# Patient Record
Sex: FEMALE | Race: WHITE | NOT HISPANIC OR LATINO | Employment: FULL TIME | URBAN - METROPOLITAN AREA
[De-identification: names, ages, dates, MRNs, and addresses within clinical notes are randomized per-mention and may not be internally consistent; named-entity substitution may affect disease eponyms.]

---

## 2017-06-15 ENCOUNTER — HOSPITAL ENCOUNTER (EMERGENCY)
Facility: CLINIC | Age: 47
Discharge: HOME | End: 2017-06-16
Attending: EMERGENCY MEDICINE | Admitting: EMERGENCY MEDICINE
Payer: COMMERCIAL

## 2017-06-15 DIAGNOSIS — R07.9 CHEST PAIN, UNSPECIFIED TYPE: ICD-10-CM

## 2017-06-15 PROCEDURE — 25000128 H RX IP 250 OP 636: Performed by: EMERGENCY MEDICINE

## 2017-06-15 PROCEDURE — 85379 FIBRIN DEGRADATION QUANT: CPT | Performed by: EMERGENCY MEDICINE

## 2017-06-15 PROCEDURE — 99285 EMERGENCY DEPT VISIT HI MDM: CPT | Mod: 25

## 2017-06-15 PROCEDURE — 83690 ASSAY OF LIPASE: CPT | Performed by: EMERGENCY MEDICINE

## 2017-06-15 PROCEDURE — 84484 ASSAY OF TROPONIN QUANT: CPT | Performed by: EMERGENCY MEDICINE

## 2017-06-15 PROCEDURE — 85027 COMPLETE CBC AUTOMATED: CPT | Performed by: EMERGENCY MEDICINE

## 2017-06-15 PROCEDURE — 80076 HEPATIC FUNCTION PANEL: CPT | Performed by: EMERGENCY MEDICINE

## 2017-06-15 PROCEDURE — 80048 BASIC METABOLIC PNL TOTAL CA: CPT | Performed by: EMERGENCY MEDICINE

## 2017-06-15 PROCEDURE — 96375 TX/PRO/DX INJ NEW DRUG ADDON: CPT

## 2017-06-15 PROCEDURE — 84703 CHORIONIC GONADOTROPIN ASSAY: CPT | Performed by: EMERGENCY MEDICINE

## 2017-06-15 PROCEDURE — 93005 ELECTROCARDIOGRAM TRACING: CPT

## 2017-06-15 PROCEDURE — 96374 THER/PROPH/DIAG INJ IV PUSH: CPT

## 2017-06-15 RX ORDER — LORAZEPAM 2 MG/ML
1 INJECTION INTRAMUSCULAR ONCE
Status: COMPLETED | OUTPATIENT
Start: 2017-06-15 | End: 2017-06-15

## 2017-06-15 RX ADMIN — LORAZEPAM 1 MG: 2 INJECTION INTRAMUSCULAR; INTRAVENOUS at 23:54

## 2017-06-15 ASSESSMENT — ENCOUNTER SYMPTOMS
FEVER: 0
VOMITING: 0
NAUSEA: 1
SHORTNESS OF BREATH: 0

## 2017-06-15 NOTE — ED AVS SNAPSHOT
[facility] Emergency Department    [address]    [facility] [address]    Phone:  [phone]    Fax:  [phone]                                       Jenifer Delong   MRN: [id number]    Department:  [facility] Emergency Department   Date of Visit:  6/15/2017           Patient Information     Date Of Birth          1970        Your diagnoses for this visit were:     Chest pain, unspecified type        You were seen by Roney Riggs MD.      Follow-up Information     Follow up with Greg, Monie Thurman. Schedule an appointment as soon as possible for a visit in 3 days.    Contact information:    [address]  [place] [address] [phone]          Discharge Instructions       Discharge Instructions  Chest Pain    You have been seen today for chest pain or discomfort.  At this time, your doctor has found no signs that your chest pain is due to a serious or life-threatening condition, (or you have declined more testing and/or admission to the hospital). However, sometimes there is a serious problem that does not show up right away. Your evaluation today may not be complete and you may need further testing and evaluation.     You need to follow-up with your regular doctor within 3 days.    Return to the Emergency Department if:    Your chest pain changes, gets worse, starts to happen more often, or comes with less activity.    You are short of breath.    You get very weak or tired.    You pass out or faint.    You have any new symptoms, like fever, cough, numb legs, or you cough up blood.    You have anything else that worries you.    Until you follow-up with your regular doctor please do the following:    Take one aspirin daily unless you have an allergy or are told not to by your doctor.    If a stress test appointment has been made, go to the appointment.    If you have questions, contact your regular doctor.    If your doctor today has told  you to follow-up with your regular doctor, it is very important that you make an appointment with your clinic and go to the appointment.  If you do not follow-up with your primary doctor, it may result in missing an important development which could result in permanent injury or disability and/or lasting pain.  If there is any problem keeping your appointment, call your doctor or return to the Emergency Department.    If you were given a prescription for medicine here today, be sure to read all of the information (including the package insert) that comes with your prescription.  This will include important information about the medicine, its side effects, and any warnings that you need to know about.  The pharmacist who fills the prescription can provide more information and answer questions you may have about the medicine.  If you have questions or concerns that the pharmacist cannot address, please call or return to the Emergency Department.         24 Hour Appointment Hotline       To make an appointment at any [facility], call [phone] ([phone]). If you don't have a family doctor or clinic, we will help you find one. [facility] are conveniently located to serve the needs of you and your family.             Review of your medicines      Our records show that you are taking the medicines listed below. If these are incorrect, please call your family doctor or clinic.        Dose / Directions Last dose taken    doxycycline 100 MG capsule   Commonly known as:  VIBRAMYCIN   Dose:  100 mg   Quantity:  20 capsule        Take 1 capsule (100 mg) by mouth 2 times daily   Refills:  0        EPINEPHrine 0.3 MG/0.3ML injection   Commonly known as:  EPIPEN 2-ADIS   Dose:  0.3 mg   Quantity:  1 each        Inject 0.3 mLs (0.3 mg) into the muscle once as needed for anaphylaxis (For sudden onset of difficulty breathing and or mouth/throat swelling)   Refills:  0        guaiFENesin-codeine 100-10 MG/5ML Soln  solution   Commonly known as:  ROBITUSSIN AC   Dose:  1 tsp.   Quantity:  120 mL        Take 5 mLs by mouth every 4 hours as needed for cough   Refills:  0        HYDROcodone-acetaminophen 5-325 MG per tablet   Commonly known as:  NORCO   Dose:  1 tablet   Quantity:  17 tablet        Take 1 tablet by mouth every 6 hours as needed for moderate to severe pain   Refills:  0        ibuprofen 600 MG tablet   Commonly known as:  ADVIL/MOTRIN   Dose:  600 mg   Quantity:  20 tablet        Take 1 tablet by mouth every 6 hours as needed for other (For mild pain or temperature greater than 102F).   Refills:  0        MIRALAX PO        Refills:  0        MIRENA (52 MG) 20 MCG/24HR IUD   Dose:  1 each   Generic drug:  levonorgestrel        1 each by Intrauterine route once   Refills:  0        NORVASC 5 MG tablet   Dose:  5 mg   Generic drug:  amLODIPine        Take 5 mg by mouth daily   Refills:  0        Polyethylene Glycol 4500 Powd   Dose:  4 oz   Quantity:  1 Bottle        4 oz every 15 minutes as needed   Refills:  0        SENNA LAXATIVE PO        Refills:  0        TYLENOL PO   Dose:  1000 mg        Take 1,000 mg by mouth as needed for mild pain or fever   Refills:  0        venlafaxine 150 MG 24 hr capsule   Commonly known as:  EFFEXOR-XR        Refills:  0                Procedures and tests performed during your visit     Procedure/Test Number of Times Performed    Abdomen US, limited (RUQ only) 1    Basic metabolic panel (BMP) 1    CBC (platelets, no diff) 1    Chest XR,  PA & LAT 1    D dimer quantitative 1    HCG QUALitative pregnancy (blood) 1    Hepatic panel 1    Lipase 1    Peripheral IV catheter 1    Troponin I (now) 2      Orders Needing Specimen Collection     None      Pending Results     Date and Time Order Name Status Description    6/16/2017 0023 Abdomen US, limited (RUQ only) Preliminary     6/16/2017 0015 Chest XR,  PA & LAT Preliminary             Pending Culture Results     No orders found for last 3  day(s).            Pending Results Instructions     If you had any lab results that were not finalized at the time of your Discharge, you can call the ED Lab Result RN at [phone]. You will be contacted by this team for any positive Lab results or changes in treatment. The nurses are available 7 days a week from 10A to 6:30P.  You can leave a message 24 hours per day and they will return your call.        Test Results From Your Hospital Stay        6/16/2017 12:02 AM      Component Results     Component Value Ref Range & Units Status    WBC 8.0 4.0 - 11.0 10e9/L Final    RBC Count 4.52 3.8 - 5.2 10e12/L Final    Hemoglobin 12.2 11.7 - 15.7 g/dL Final    Hematocrit 37.9 35.0 - 47.0 % Final    MCV 84 78 - 100 fl Final    MCH 27.0 26.5 - 33.0 pg Final    MCHC 32.2 31.5 - 36.5 g/dL Final    RDW 14.4 10.0 - 15.0 % Final    Platelet Count 348 150 - 450 10e9/L Final         6/16/2017 12:20 AM      Component Results     Component Value Ref Range & Units Status    Sodium 137 133 - 144 mmol/L Final    Potassium 3.8 3.4 - 5.3 mmol/L Final    Chloride 105 94 - 109 mmol/L Final    Carbon Dioxide 27 20 - 32 mmol/L Final    Anion Gap 5 3 - 14 mmol/L Final    Glucose 121 (H) 70 - 99 mg/dL Final    Urea Nitrogen 19 7 - 30 mg/dL Final    Creatinine 1.12 (H) 0.52 - 1.04 mg/dL Final    GFR Estimate 52 (L) >60 mL/min/1.7m2 Final    Non  GFR Calc    GFR Estimate If Black 63 >60 mL/min/1.7m2 Final    African American GFR Calc    Calcium 9.0 8.5 - 10.1 mg/dL Final         6/16/2017 12:20 AM      Component Results     Component Value Ref Range & Units Status    Troponin I ES  0.000 - 0.045 ug/L Final    <0.015  The 99th percentile for upper reference range is 0.045 ug/L.  Troponin values in   the range of 0.045 - 0.120 ug/L may be associated with risks of adverse   clinical events.           6/16/2017 12:33 AM      Component Results     Component Value Ref Range & Units Status    HCG Qualitative Serum Negative NEG Final          6/16/2017 12:12 AM      Component Results     Component Value Ref Range & Units Status    D Dimer 0.4 0.0 - 0.50 ug/ml FEU Final    This D-dimer assay is intended for use in conjuntion with a clinical pretest   probability assessment model to exclude pulmonary embolism (PE) and as an aid   in the diagnosis of deep venous thrombosis (DVT) in outpatients suspected of   PE   or DVT. The cut-off value is 0.5 g/mL FEU.           6/16/2017 12:59 AM      Narrative     XR CHEST 2 VW  6/16/2017 12:45 AM      HISTORY: Chest pain.    COMPARISON: 9/12/2013.    FINDINGS: The heart size is normal. The lungs are clear. No  pneumothorax or pleural effusion.        Impression     IMPRESSION: No acute abnormality.         6/16/2017  1:24 AM      Narrative     US ABDOMEN LIMITED  6/16/2017 1:07 AM      HISTORY: Epigastric pain.     COMPARISON: None.    FINDINGS: The liver is normal in size and texture without focal mass.  There is no intra or extrahepatic biliary dilatation. The common  hepatic duct measures 0.4 cm. The gallbladder is partially contracted.  No gallstones. The pancreas head and body appear normal. The tail is  obscured by bowel gas. The right kidney measures 10.7 cm and is normal  in appearance. The proximal abdominal aorta and IVC appear normal.         Impression     IMPRESSION: Normal right upper quadrant. No gallstone or biliary  dilatation.         6/16/2017 12:47 AM      Component Results     Component Value Ref Range & Units Status    Bilirubin Direct <0.1 0.0 - 0.2 mg/dL Final    Bilirubin Total 0.3 0.2 - 1.3 mg/dL Final    Albumin 4.0 3.4 - 5.0 g/dL Final    Protein Total 7.8 6.8 - 8.8 g/dL Final    Alkaline Phosphatase 83 40 - 150 U/L Final    ALT 44 0 - 50 U/L Final    AST 31 0 - 45 U/L Final         6/16/2017 12:32 AM      Component Results     Component Value Ref Range & Units Status    Lipase 211 73 - 393 U/L Final         6/16/2017  2:18 AM      Component Results     Component Value Ref Range &  Units Status    Troponin I ES  0.000 - 0.045 ug/L Final    <0.015  The 99th percentile for upper reference range is 0.045 ug/L.  Troponin values in   the range of 0.045 - 0.120 ug/L may be associated with risks of adverse   clinical events.                  Clinical Quality Measure: Blood Pressure Screening     Your blood pressure was checked while you were in the emergency department today. The last reading we obtained was  BP: 119/82 . Please read the guidelines below about what these numbers mean and what you should do about them.  If your systolic blood pressure (the top number) is less than 120 and your diastolic blood pressure (the bottom number) is less than 80, then your blood pressure is normal. There is nothing more that you need to do about it.  If your systolic blood pressure (the top number) is 120-139 or your diastolic blood pressure (the bottom number) is 80-89, your blood pressure may be higher than it should be. You should have your blood pressure rechecked within a year by a primary care provider.  If your systolic blood pressure (the top number) is 140 or greater or your diastolic blood pressure (the bottom number) is 90 or greater, you may have high blood pressure. High blood pressure is treatable, but if left untreated over time it can put you at risk for heart attack, stroke, or kidney failure. You should have your blood pressure rechecked by a primary care provider within the next 4 weeks.  If your provider in the emergency department today gave you specific instructions to follow-up with your doctor or provider even sooner than that, you should follow that instruction and not wait for up to 4 weeks for your follow-up visit.        Thank you for choosing [facility]       Thank you for choosing [facility] for your care. Our goal is always to provide you with excellent care. Hearing back from our patients is one way we can continue to improve our services. Please take a few minutes to complete the  written survey that you may receive in the mail after you visit with us. Thank you!        [email or web address] Information     [employer] gives you secure access to your electronic health record. If you see a primary care provider, you can also send messages to your care team and make appointments. If you have questions, please call your primary care clinic.  If you do not have a primary care provider, please call [phone] and they will assist you.        Care EveryWhere ID     This is your Care EveryWhere ID. This could be used by other organizations to access your [place] medical records  [id number]        After Visit Summary       This is your record. Keep this with you and show to your community pharmacist(s) and doctor(s) at your next visit.

## 2017-06-15 NOTE — ED AVS SNAPSHOT
[facility] Emergency Department    [address]    [facility] [address]    Phone:  [phone]    Fax:  [phone]                                       Jenifer Delong   MRN: [id number]    Department:  [facility] Emergency Department   Date of Visit:  6/15/2017           After Visit Summary Signature Page     I have received my discharge instructions, and my questions have been answered. I have discussed any challenges I see with this plan with the nurse or doctor.    ..........................................................................................................................................  Patient/Patient Representative Signature      ..........................................................................................................................................  Patient Representative Print Name and Relationship to Patient    ..................................................               ................................................  Date                                            Time    ..........................................................................................................................................  Reviewed by Signature/Title    ...................................................              ..............................................  Date                                                            Time

## 2017-06-16 ENCOUNTER — APPOINTMENT (OUTPATIENT)
Dept: GENERAL RADIOLOGY | Facility: CLINIC | Age: 47
End: 2017-06-16
Attending: EMERGENCY MEDICINE
Payer: COMMERCIAL

## 2017-06-16 ENCOUNTER — APPOINTMENT (OUTPATIENT)
Dept: ULTRASOUND IMAGING | Facility: CLINIC | Age: 47
End: 2017-06-16
Attending: EMERGENCY MEDICINE
Payer: COMMERCIAL

## 2017-06-16 VITALS
TEMPERATURE: 98.2 F | OXYGEN SATURATION: 97 % | HEART RATE: 91 BPM | RESPIRATION RATE: 18 BRPM | DIASTOLIC BLOOD PRESSURE: 82 MMHG | BODY MASS INDEX: 33.67 KG/M2 | WEIGHT: 215 LBS | SYSTOLIC BLOOD PRESSURE: 119 MMHG

## 2017-06-16 LAB
ALBUMIN SERPL-MCNC: 4 G/DL (ref 3.4–5)
ALP SERPL-CCNC: 83 U/L (ref 40–150)
ALT SERPL W P-5'-P-CCNC: 44 U/L (ref 0–50)
ANION GAP SERPL CALCULATED.3IONS-SCNC: 5 MMOL/L (ref 3–14)
AST SERPL W P-5'-P-CCNC: 31 U/L (ref 0–45)
BILIRUB DIRECT SERPL-MCNC: <0.1 MG/DL (ref 0–0.2)
BILIRUB SERPL-MCNC: 0.3 MG/DL (ref 0.2–1.3)
BUN SERPL-MCNC: 19 MG/DL (ref 7–30)
CALCIUM SERPL-MCNC: 9 MG/DL (ref 8.5–10.1)
CHLORIDE SERPL-SCNC: 105 MMOL/L (ref 94–109)
CO2 SERPL-SCNC: 27 MMOL/L (ref 20–32)
CREAT SERPL-MCNC: 1.12 MG/DL (ref 0.52–1.04)
D DIMER PPP FEU-MCNC: 0.4 UG/ML FEU (ref 0–0.5)
ERYTHROCYTE [DISTWIDTH] IN BLOOD BY AUTOMATED COUNT: 14.4 % (ref 10–15)
GFR SERPL CREATININE-BSD FRML MDRD: 52 ML/MIN/1.7M2
GLUCOSE SERPL-MCNC: 121 MG/DL (ref 70–99)
HCG SERPL QL: NEGATIVE
HCT VFR BLD AUTO: 37.9 % (ref 35–47)
HGB BLD-MCNC: 12.2 G/DL (ref 11.7–15.7)
INTERPRETATION ECG - MUSE: NORMAL
LIPASE SERPL-CCNC: 211 U/L (ref 73–393)
MCH RBC QN AUTO: 27 PG (ref 26.5–33)
MCHC RBC AUTO-ENTMCNC: 32.2 G/DL (ref 31.5–36.5)
MCV RBC AUTO: 84 FL (ref 78–100)
PLATELET # BLD AUTO: 348 10E9/L (ref 150–450)
POTASSIUM SERPL-SCNC: 3.8 MMOL/L (ref 3.4–5.3)
PROT SERPL-MCNC: 7.8 G/DL (ref 6.8–8.8)
RBC # BLD AUTO: 4.52 10E12/L (ref 3.8–5.2)
SODIUM SERPL-SCNC: 137 MMOL/L (ref 133–144)
TROPONIN I SERPL-MCNC: NORMAL UG/L (ref 0–0.04)
TROPONIN I SERPL-MCNC: NORMAL UG/L (ref 0–0.04)
WBC # BLD AUTO: 8 10E9/L (ref 4–11)

## 2017-06-16 PROCEDURE — 71020 XR CHEST 2 VW: CPT

## 2017-06-16 PROCEDURE — 76705 ECHO EXAM OF ABDOMEN: CPT

## 2017-06-16 PROCEDURE — 84484 ASSAY OF TROPONIN QUANT: CPT | Performed by: EMERGENCY MEDICINE

## 2017-06-16 PROCEDURE — 25000128 H RX IP 250 OP 636

## 2017-06-16 RX ORDER — KETOROLAC TROMETHAMINE 15 MG/ML
INJECTION, SOLUTION INTRAMUSCULAR; INTRAVENOUS
Status: COMPLETED
Start: 2017-06-16 | End: 2017-06-16

## 2017-06-16 RX ORDER — KETOROLAC TROMETHAMINE 15 MG/ML
15 INJECTION, SOLUTION INTRAMUSCULAR; INTRAVENOUS ONCE
Status: COMPLETED | OUTPATIENT
Start: 2017-06-16 | End: 2017-06-16

## 2017-06-16 RX ADMIN — KETOROLAC TROMETHAMINE 15 MG: 15 INJECTION, SOLUTION INTRAMUSCULAR; INTRAVENOUS at 00:17

## 2017-06-16 NOTE — ED PROVIDER NOTES
History     Chief Complaint:  Chest Pain       HPI   Jenifer Delong is a 46 year old female who presents with chest pain.  Patient states that she was in her normal state of health up until 2 hours and 15 minutes ago.  She is sitting down with her children when she had sudden onset of central chest pain.  Initially this felt tight and it has been waxing and waning with sharp episodes of chest pain.  She feels better if she brings her arms against her chest and it is worsened by touch.  The pain is non-radiating. She feels mildly nauseated without any significant shortness of breath.  She had not experienced similar pain.  She denies any history of DVT, PE, unilateral leg swelling, hemoptysis, estrogen use.  She does have a history of breast cancer in which she has completed her chemo and radiation this spring..    Allergies:  Hornets  No Known Drug Allergies     Medications:    Acetaminophen (TYLENOL PO)    --  --  Reported, Patient        Take 1,000 mg by mouth as needed for mild pain or fever      Notes:   Last dose 3 hours ago      amLODIPine (NORVASC) 5 MG tablet    --  --  Reported, Patient      Take 5 mg by mouth daily      doxycycline (VIBRAMYCIN) 100 MG capsule    11/14/16  --  Jair Burgess MD      Take 1 capsule (100 mg) by mouth 2 times daily      EPINEPHrine (EPIPEN 2-ADIS) 0.3 MG/0.3ML injection    09/12/13  --  Simona Bowie MD      Inject 0.3 mLs (0.3 mg) into the muscle once as needed for anaphylaxis (For sudden onset of difficulty breathing and or mouth/throat swelling)      guaiFENesin-codeine (ROBITUSSIN AC) 100-10 MG/5ML SOLN    11/14/16  --  Jair Burgess MD      Take 5 mLs by mouth every 4 hours as needed for cough      HYDROcodone-acetaminophen (NORCO) 5-325 MG per tablet    11/09/16  --  Jorgito Muniz MD      Take 1 tablet by mouth every 6 hours as needed for moderate to severe pain      ibuprofen (ADVIL,MOTRIN) 600 MG tablet    03/18/13  --  Dayna Granger MD       Take 1 tablet by mouth every 6 hours as needed for other (For mild pain or temperature greater than 102F).      Notes:   Took 800 mg 1 1/2 hours ago      levonorgestrel (MIRENA) 20 MCG/24HR IUD    --  --  Reported, Patient      1 each by Intrauterine route once      Polyethylene Glycol 3350 (MIRALAX PO)    --  --  Reported, Patient            Polyethylene Glycol 4500 POWD    16  --  Jair Burgess MD      4 oz every 15 minutes as needed      Sennosides (SENNA LAXATIVE PO)    --  --  Reported, Patient            venlafaxine (EFFEXOR-XR) 150 MG 24 hr capsule    13  --  Reported, Patient       Past Medical History:    Past Medical History:   Diagnosis Date     Abnormal Pap smear of cervix      Cancer (H)      Depressive disorder      Hypertension         Past Surgical History:    Past Surgical History:   Procedure Laterality Date     BREAST SURGERY  2016     C  DELIVERY+POSTPARTUM CARE  2002,2008     LAPAROSCOPY DIAGNOSTIC (GYN)       OPERATIVE HYSTEROSCOPY  3/18/2013    Procedure: OPERATIVE HYSTEROSCOPY;  Hysteroscopic removal of retained Intrauterine device, with placement of Nexplanon Left ;  Surgeon: Dayna Granger MD;  Location: [facility] OR        Family History:    family history includes Breast Cancer in her paternal aunt; C.A.D. in her paternal grandmother and paternal uncle; CANCER in her father; CEREBROVASCULAR DISEASE in her father; Cancer - colorectal in her paternal grandfather.    Social History:   reports that she has never smoked. She has never used smokeless tobacco. She reports that she does not drink alcohol or use illicit drugs.    PCP: [facility]     Review of Systems   Constitutional: Negative for fever.   Respiratory: Negative for shortness of breath.    Cardiovascular: Positive for chest pain. Negative for leg swelling.   Gastrointestinal: Positive for nausea. Negative for vomiting.   Skin: Negative for rash.   All other  systems reviewed and are negative.        Physical Exam   Patient Vitals for the past 24 hrs:   BP Temp Temp src Pulse Resp SpO2 Weight   06/15/17 2233 189/75 98.2  F (36.8  C) Temporal 91 18 98 % 97.5 kg (215 lb)        Physical Exam   General:   Pleasant, age appropriate anxious female holding her arms to her chest.  HEENT:    Oropharynx is moist, without lesions or trismus.  Eyes:    Conjunctiva normal  Neck:    Supple, no meningismus.     CV:     Regular rate and rhythm.      No murmurs, rubs or gallops.       No JVD or unilateral leg swelling.       2+ radial pulses bilateral.       No lower extremity edema.  PULM:    Clear to auscultation bilateral.       No respiratory distress.      Good air exchange.     No rales or wheezing.     No stridor.     Central chest wall is tender to touch and reproduces her pain.     Right mastectomy  ABD:    Soft, non-tender, non-distended.       No pulsatile masses.       No rebound, guarding or rigidity.  MSK:     No gross deformity to all four extremities.   LYMPH:   No cervical lymphadenopathy.  NEURO:   Alert. Good muscle tone, no atrophy.  Skin:    Warm, dry and intact.    Psych:    Anxious, tearful        Emergency Department Course     ECG (22:27:31):  Rate 85 bpm.   QT/QTc 368-437.   P-R-T axes 46.    Interpreted at 2351 by Roney Riggs MD.     Imaging:    Abdomen US, limited (RUQ only)   Preliminary Result   IMPRESSION: Normal right upper quadrant. No gallstone or biliary   dilatation.      Chest XR,  PA & LAT   Preliminary Result   IMPRESSION: No acute abnormality.           Laboratory:  Results for orders placed or performed during the hospital encounter of 06/15/17 (from the past 24 hour(s))   CBC (platelets, no diff)   Result Value Ref Range    WBC 8.0 4.0 - 11.0 10e9/L    RBC Count 4.52 3.8 - 5.2 10e12/L    Hemoglobin 12.2 11.7 - 15.7 g/dL    Hematocrit 37.9 35.0 - 47.0 %    MCV 84 78 - 100 fl    MCH 27.0 26.5 - 33.0 pg    MCHC 32.2 31.5 - 36.5 g/dL     RDW 14.4 10.0 - 15.0 %    Platelet Count 348 150 - 450 10e9/L   Basic metabolic panel (BMP)   Result Value Ref Range    Sodium 137 133 - 144 mmol/L    Potassium 3.8 3.4 - 5.3 mmol/L    Chloride 105 94 - 109 mmol/L    Carbon Dioxide 27 20 - 32 mmol/L    Anion Gap 5 3 - 14 mmol/L    Glucose 121 (H) 70 - 99 mg/dL    Urea Nitrogen 19 7 - 30 mg/dL    Creatinine 1.12 (H) 0.52 - 1.04 mg/dL    GFR Estimate 52 (L) >60 mL/min/1.7m2    GFR Estimate If Black 63 >60 mL/min/1.7m2    Calcium 9.0 8.5 - 10.1 mg/dL   Troponin I (now)   Result Value Ref Range    Troponin I ES  0.000 - 0.045 ug/L     <0.015  The 99th percentile for upper reference range is 0.045 ug/L.  Troponin values in   the range of 0.045 - 0.120 ug/L may be associated with risks of adverse   clinical events.     HCG QUALitative pregnancy (blood)   Result Value Ref Range    HCG Qualitative Serum Negative NEG   D dimer quantitative   Result Value Ref Range    D Dimer 0.4 0.0 - 0.50 ug/ml FEU   Hepatic panel   Result Value Ref Range    Bilirubin Direct <0.1 0.0 - 0.2 mg/dL    Bilirubin Total 0.3 0.2 - 1.3 mg/dL    Albumin 4.0 3.4 - 5.0 g/dL    Protein Total 7.8 6.8 - 8.8 g/dL    Alkaline Phosphatase 83 40 - 150 U/L    ALT 44 0 - 50 U/L    AST 31 0 - 45 U/L   Lipase   Result Value Ref Range    Lipase 211 73 - 393 U/L   Chest XR,  PA & LAT    Narrative    XR CHEST 2 VW  6/16/2017 12:45 AM      HISTORY: Chest pain.    COMPARISON: 9/12/2013.    FINDINGS: The heart size is normal. The lungs are clear. No  pneumothorax or pleural effusion.      Impression    IMPRESSION: No acute abnormality.   Abdomen US, limited (RUQ only)    Narrative    US ABDOMEN LIMITED  6/16/2017 1:07 AM      HISTORY: Epigastric pain.     COMPARISON: None.    FINDINGS: The liver is normal in size and texture without focal mass.  There is no intra or extrahepatic biliary dilatation. The common  hepatic duct measures 0.4 cm. The gallbladder is partially contracted.  No gallstones. The pancreas head and  body appear normal. The tail is  obscured by bowel gas. The right kidney measures 10.7 cm and is normal  in appearance. The proximal abdominal aorta and IVC appear normal.       Impression    IMPRESSION: Normal right upper quadrant. No gallstone or biliary  dilatation.   Troponin I (now)   Result Value Ref Range    Troponin I ES  0.000 - 0.045 ug/L     <0.015  The 99th percentile for upper reference range is 0.045 ug/L.  Troponin values in   the range of 0.045 - 0.120 ug/L may be associated with risks of adverse   clinical events.         Interventions:  Medications   LORazepam (ATIVAN) injection 1 mg (1 mg Intravenous Given 6/15/17 5575)   ketorolac (TORADOL) injection 15 mg (15 mg Intravenous Given 17 0017)        Emergency Department Course:  Past medical records, nursing notes, and vitals reviewed.  I performed an exam of the patient and obtained history, as documented above.    I rechecked the patient. Findings and plan explained to the Patient and . Patient was discharged home.    Impression & Plan         Medical Decision Makin year old female presenting with a two-hour history of chest pain.  Her history was not consistent with ACS.  EKG shows no ischemic changes and troponin was within normal limits.  Given her short-lived symptoms, delta two-hour troponin was undertaken which is also negative.  Her only risk factor for pulmonary embolus was recent history of breast cancer and d-dimer is negative thus no indication for CT scan of the chest.  She felt this may be related to referred pain from biliary disease but workup for this process was negative.  She had reproducible pain on examination indicating more a musculoskeletal process.  I also had concerns some this may be anxiety related and she was given Ativan with remarkable improvement.  Patient is safe for discharge home with follow-up with primary care physician.    Diagnosis:    ICD-10-CM    1. Chest pain, unspecified type R07.9             6/15/2017   Roney Riggs MD Matthews, Jeremiah R, MD  06/16/17 0357

## 2017-06-16 NOTE — ED NOTES
46-year-old female presents to the ER with complaints chest pain/burning. Pt states she was just sitting with her kids when it started. EKG completed at triage. Denies N/V/D.

## 2017-08-12 ENCOUNTER — HEALTH MAINTENANCE LETTER (OUTPATIENT)
Age: 47
End: 2017-08-12

## 2018-01-10 ENCOUNTER — APPOINTMENT (OUTPATIENT)
Dept: GENERAL RADIOLOGY | Facility: CLINIC | Age: 48
End: 2018-01-10
Attending: PHYSICIAN ASSISTANT
Payer: OTHER MISCELLANEOUS

## 2018-01-10 ENCOUNTER — HOSPITAL ENCOUNTER (EMERGENCY)
Facility: CLINIC | Age: 48
Discharge: HOME | End: 2018-01-10
Attending: PHYSICIAN ASSISTANT | Admitting: PHYSICIAN ASSISTANT
Payer: OTHER MISCELLANEOUS

## 2018-01-10 VITALS
WEIGHT: 210 LBS | SYSTOLIC BLOOD PRESSURE: 143 MMHG | DIASTOLIC BLOOD PRESSURE: 87 MMHG | RESPIRATION RATE: 20 BRPM | BODY MASS INDEX: 32.96 KG/M2 | HEART RATE: 82 BPM | TEMPERATURE: 98.7 F | HEIGHT: 67 IN | OXYGEN SATURATION: 100 %

## 2018-01-10 DIAGNOSIS — R07.81 RIB PAIN ON RIGHT SIDE: ICD-10-CM

## 2018-01-10 DIAGNOSIS — M25.532 LEFT WRIST PAIN: ICD-10-CM

## 2018-01-10 DIAGNOSIS — M25.561 ACUTE PAIN OF RIGHT KNEE: ICD-10-CM
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
## 2018-01-10 PROCEDURE — 73110 X-RAY EXAM OF WRIST: CPT | Mod: LT

## 2018-01-10 PROCEDURE — 73562 X-RAY EXAM OF KNEE 3: CPT | Mod: RT

## 2018-01-10 PROCEDURE — 71101 X-RAY EXAM UNILAT RIBS/CHEST: CPT | Mod: RT

## 2018-01-10 PROCEDURE — 96372 THER/PROPH/DIAG INJ SC/IM: CPT

## 2018-01-10 PROCEDURE — 99285 EMERGENCY DEPT VISIT HI MDM: CPT | Mod: 25

## 2018-01-10 PROCEDURE — 25000128 H RX IP 250 OP 636: Performed by: PHYSICIAN ASSISTANT

## 2018-01-10 RX ORDER — ACETAMINOPHEN 500 MG
1000 TABLET ORAL ONCE
Status: DISCONTINUED | OUTPATIENT
Start: 2018-01-10 | End: 2018-01-10

## 2018-01-10 RX ORDER — TRAMADOL HYDROCHLORIDE 50 MG/1
50-100 TABLET ORAL EVERY 6 HOURS PRN
Qty: 8 TABLET | Refills: 0 | Status: SHIPPED | OUTPATIENT
Start: 2018-01-10 | End: 2018-10-17

## 2018-01-10 RX ADMIN — HYDROMORPHONE HYDROCHLORIDE 1 MG: 1 INJECTION, SOLUTION INTRAMUSCULAR; INTRAVENOUS; SUBCUTANEOUS at 12:43

## 2018-01-10 ASSESSMENT — ENCOUNTER SYMPTOMS
HEADACHES: 0
ARTHRALGIAS: 1
VOMITING: 0

## 2018-01-10 NOTE — DISCHARGE INSTRUCTIONS
Rest, ice, elevate.   Ace wrap to knee.   Follow up with primary care if not improving over next 5 days.   Ibuprofen or tylenol for pain.   If more severe pain - you can take 1-2 tablets of tramadol. Avoid driving or drinking alcohol while on this medication.   Return if worsening pain, high fevers, difficulty breathing, cough or any other new concerns.

## 2018-01-10 NOTE — ED NOTES
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 Pt c/o pain in left wrist, right knee and right rib cage. Pt is ambulatory.

## 2018-01-10 NOTE — ED AVS SNAPSHOT
[facility] Emergency Department    [address]    [facility] [address]    Phone:  [phone]    Fax:  [phone]                                       Jenifer Delong   MRN: [id number]    Department:  [facility] Emergency Department   Date of Visit:  1/10/2018           After Visit Summary Signature Page     I have received my discharge instructions, and my questions have been answered. I have discussed any challenges I see with this plan with the nurse or doctor.    ..........................................................................................................................................  Patient/Patient Representative Signature      ..........................................................................................................................................  Patient Representative Print Name and Relationship to Patient    ..................................................               ................................................  Date                                            Time    ..........................................................................................................................................  Reviewed by Signature/Title    ...................................................              ..............................................  Date                                                            Time

## 2018-01-10 NOTE — ED AVS SNAPSHOT
[facility] Emergency Department    [address]    [facility] [address]    Phone:  [phone]    Fax:  [phone]                                       Jenifer Delong   MRN: [id number]    Department:  [facility] Emergency Department   Date of Visit:  1/10/2018           Patient Information     Date Of Birth          1970        Your diagnoses for this visit were:     Fall, initial encounter     Acute pain of right knee     Rib pain on right side     Left wrist pain        You were seen by Reina Stringer PA-C.      Follow-up Information     Follow up with [facility] Emergency Department.    Specialty:  EMERGENCY MEDICINE    Why:  If symptoms worsen    Contact information:    [address]  [facility] [address] [phone]        Follow up with Monie Garza In 5 days.    Contact information:    [address] [facility] [address] [phone]          Discharge Instructions       Rest, ice, elevate.   Ace wrap to knee.   Follow up with primary care if not improving over next 5 days.   Ibuprofen or tylenol for pain.   If more severe pain - you can take 1-2 tablets of tramadol. Avoid driving or drinking alcohol while on this medication.   Return if worsening pain, high fevers, difficulty breathing, cough or any other new concerns.         Discharge References/Attachments     R.I.C.E. (ENGLISH)    RIB CONTUSION (ENGLISH)      24 Hour Appointment Hotline       To make an appointment at any [facility], call [phone] ([phone]). If you don't have a family doctor or clinic, we will help you find one. [facility] are conveniently located to serve the needs of you and your family.             Review of your medicines      Our records show that you are taking the medicines listed below. If these are incorrect, please call your family doctor or clinic.        Dose / Directions Last dose taken     doxycycline 100 MG capsule   Commonly known as:  VIBRAMYCIN   Dose:  100 mg   Quantity:  20 capsule        Take 1 capsule (100 mg) by mouth 2 times daily   Refills:  0        EPINEPHrine 0.3 MG/0.3ML injection   Commonly known as:  EPIPEN 2-ADIS   Dose:  0.3 mg   Quantity:  1 each        Inject 0.3 mLs (0.3 mg) into the muscle once as needed for anaphylaxis (For sudden onset of difficulty breathing and or mouth/throat swelling)   Refills:  0        guaiFENesin-codeine 100-10 MG/5ML Soln solution   Commonly known as:  ROBITUSSIN AC   Dose:  1 tsp.   Quantity:  120 mL        Take 5 mLs by mouth every 4 hours as needed for cough   Refills:  0        HYDROcodone-acetaminophen 5-325 MG per tablet   Commonly known as:  NORCO   Dose:  1 tablet   Quantity:  17 tablet        Take 1 tablet by mouth every 6 hours as needed for moderate to severe pain   Refills:  0        ibuprofen 600 MG tablet   Commonly known as:  ADVIL/MOTRIN   Dose:  600 mg   Quantity:  20 tablet        Take 1 tablet by mouth every 6 hours as needed for other (For mild pain or temperature greater than 102F).   Refills:  0        MIRALAX PO        Refills:  0        MIRENA (52 MG) 20 MCG/24HR IUD   Dose:  1 each   Generic drug:  levonorgestrel        1 each by Intrauterine route once   Refills:  0        NORVASC 5 MG tablet   Dose:  5 mg   Generic drug:  amLODIPine        Take 5 mg by mouth daily   Refills:  0        Polyethylene Glycol 4500 Powd   Dose:  4 oz   Quantity:  1 Bottle        4 oz every 15 minutes as needed   Refills:  0        SENNA LAXATIVE PO        Refills:  0        TYLENOL PO   Dose:  1000 mg        Take 1,000 mg by mouth as needed for mild pain or fever   Refills:  0        venlafaxine 150 MG 24 hr capsule   Commonly known as:  EFFEXOR-XR        Refills:  0                Procedures and tests performed during your visit     Knee XR, 3 views, right    Ribs XR, unilat 3 views + PA chest, right    Wrist XR, G/E 3 views, left      Orders  Needing Specimen Collection     None      Pending Results     No orders found from 1/8/2018 to 1/11/2018.            Pending Culture Results     No orders found from 1/8/2018 to 1/11/2018.            Pending Results Instructions     If you had any lab results that were not finalized at the time of your Discharge, you can call the ED Lab Result RN at [phone]. You will be contacted by this team for any positive Lab results or changes in treatment. The nurses are available 7 days a week from 10A to 6:30P.  You can leave a message 24 hours per day and they will return your call.        Test Results From Your Hospital Stay        1/10/2018  1:36 PM      Narrative     XR WRIST LEFT G/E 3 VIEWS 1/10/2018 1:20 PM    COMPARISON: None.    HISTORY: Pain after fall.        Impression     IMPRESSION: No fractures are seen in the left wrist. Joints are  preserved and in normal alignment.    MEAGAN DELATORRE MD         1/10/2018  1:34 PM      Narrative     XR RIBS & CHEST RT 3VW 1/10/2018 1:19 PM    COMPARISON: 6/16/2017    HISTORY: Pain after fall.        Impression     IMPRESSION: Scarring in the right apex is again seen and unchanged. No  new focal airspace disease. No pleural effusion or pneumothorax.  Cardiac silhouette within normal limits. No rib fractures are seen.  Multiple surgical clips project over the right chest and axilla.    MEAGAN DELATORRE MD         1/10/2018  1:35 PM      Narrative     XR KNEE RT 3 VW 1/10/2018 1:19 PM    COMPARISON: None.    HISTORY: Pain after fall.        Impression     IMPRESSION: No fractures are seen in the right knee. Joint spaces are  preserved and in normal alignment there is mild soft tissue swelling  overlying the patella.    MEAAGN DELATORRE MD                Clinical Quality Measure: Blood Pressure Screening     Your blood pressure was checked while you were in the emergency department today. The last reading we obtained was  BP: 143/87 . Please read the guidelines below about what  these numbers mean and what you should do about them.  If your systolic blood pressure (the top number) is less than 120 and your diastolic blood pressure (the bottom number) is less than 80, then your blood pressure is normal. There is nothing more that you need to do about it.  If your systolic blood pressure (the top number) is 120-139 or your diastolic blood pressure (the bottom number) is 80-89, your blood pressure may be higher than it should be. You should have your blood pressure rechecked within a year by a primary care provider.  If your systolic blood pressure (the top number) is 140 or greater or your diastolic blood pressure (the bottom number) is 90 or greater, you may have high blood pressure. High blood pressure is treatable, but if left untreated over time it can put you at risk for heart attack, stroke, or kidney failure. You should have your blood pressure rechecked by a primary care provider within the next 4 weeks.  If your provider in the emergency department today gave you specific instructions to follow-up with your doctor or provider even sooner than that, you should follow that instruction and not wait for up to 4 weeks for your follow-up visit.        Thank you for choosing [facility]       Thank you for choosing [facility] for your care. Our goal is always to provide you with excellent care. Hearing back from our patients is one way we can continue to improve our services. Please take a few minutes to complete the written survey that you may receive in the mail after you visit with us. Thank you!        [employer] Information     [employer] gives you secure access to your electronic health record. If you see a primary care provider, you can also send messages to your care team and make appointments. If you have questions, please call your primary care clinic.  If you do not have a primary care provider, please call [phone] and they will assist you.        Care EveryWhere ID     This is your  Care EveryWhere ID. This could be used by other organizations to access your [facility] medical records  [id number]        Equal Access to Services     LAURA YBARRA : Zack Broderick, rogerio miranda, nika rubin, cornelio poole. So [facility] [phone].    ATENCIÓN: Si habla español, tiene a phelps disposición servicios gratuitos de asistencia lingüística. Llame al [phone].    We comply with applicable federal civil rights laws and [place] laws. We do not discriminate on the basis of race, color, national origin, age, disability, sex, sexual orientation, or gender identity.            After Visit Summary       This is your record. Keep this with you and show to your community pharmacist(s) and doctor(s) at your next visit.

## 2018-05-20 ENCOUNTER — HEALTH MAINTENANCE LETTER (OUTPATIENT)
Age: 48
End: 2018-05-20

## 2018-10-17 ENCOUNTER — HOSPITAL ENCOUNTER (EMERGENCY)
Facility: CLINIC | Age: 48
Discharge: HOME | End: 2018-10-18
Attending: EMERGENCY MEDICINE | Admitting: EMERGENCY MEDICINE
Payer: COMMERCIAL

## 2018-10-17 ENCOUNTER — APPOINTMENT (OUTPATIENT)
Dept: GENERAL RADIOLOGY | Facility: CLINIC | Age: 48
End: 2018-10-17
Attending: EMERGENCY MEDICINE
Payer: COMMERCIAL

## 2018-10-17 DIAGNOSIS — R07.9 CHEST PAIN, UNSPECIFIED TYPE: ICD-10-CM

## 2018-10-17 LAB
ANION GAP SERPL CALCULATED.3IONS-SCNC: 7 MMOL/L (ref 3–14)
BASOPHILS # BLD AUTO: 0 10E9/L (ref 0–0.2)
BASOPHILS NFR BLD AUTO: 0.3 %
BUN SERPL-MCNC: 17 MG/DL (ref 7–30)
CALCIUM SERPL-MCNC: 8.3 MG/DL (ref 8.5–10.1)
CHLORIDE SERPL-SCNC: 105 MMOL/L (ref 94–109)
CO2 SERPL-SCNC: 26 MMOL/L (ref 20–32)
CREAT SERPL-MCNC: 0.86 MG/DL (ref 0.52–1.04)
D DIMER PPP FEU-MCNC: 0.5 UG/ML FEU (ref 0–0.5)
DIFFERENTIAL METHOD BLD: NORMAL
EOSINOPHIL # BLD AUTO: 0.2 10E9/L (ref 0–0.7)
EOSINOPHIL NFR BLD AUTO: 3.1 %
ERYTHROCYTE [DISTWIDTH] IN BLOOD BY AUTOMATED COUNT: 12.5 % (ref 10–15)
GFR SERPL CREATININE-BSD FRML MDRD: 71 ML/MIN/1.7M2
GLUCOSE SERPL-MCNC: 131 MG/DL (ref 70–99)
HCT VFR BLD AUTO: 39.8 % (ref 35–47)
HGB BLD-MCNC: 13.5 G/DL (ref 11.7–15.7)
IMM GRANULOCYTES # BLD: 0 10E9/L (ref 0–0.4)
IMM GRANULOCYTES NFR BLD: 0.5 %
LYMPHOCYTES # BLD AUTO: 2 10E9/L (ref 0.8–5.3)
LYMPHOCYTES NFR BLD AUTO: 25.5 %
MCH RBC QN AUTO: 30.5 PG (ref 26.5–33)
MCHC RBC AUTO-ENTMCNC: 33.9 G/DL (ref 31.5–36.5)
MCV RBC AUTO: 90 FL (ref 78–100)
MONOCYTES # BLD AUTO: 0.5 10E9/L (ref 0–1.3)
MONOCYTES NFR BLD AUTO: 6.1 %
NEUTROPHILS # BLD AUTO: 5 10E9/L (ref 1.6–8.3)
NEUTROPHILS NFR BLD AUTO: 64.5 %
NRBC # BLD AUTO: 0 10*3/UL
NRBC BLD AUTO-RTO: 0 /100
PLATELET # BLD AUTO: 344 10E9/L (ref 150–450)
POTASSIUM SERPL-SCNC: 3.9 MMOL/L (ref 3.4–5.3)
RBC # BLD AUTO: 4.43 10E12/L (ref 3.8–5.2)
SODIUM SERPL-SCNC: 138 MMOL/L (ref 133–144)
TROPONIN I SERPL-MCNC: <0.015 UG/L (ref 0–0.04)
WBC # BLD AUTO: 7.7 10E9/L (ref 4–11)

## 2018-10-17 PROCEDURE — 85025 COMPLETE CBC W/AUTO DIFF WBC: CPT | Performed by: PHYSICIAN ASSISTANT

## 2018-10-17 PROCEDURE — 25000132 ZZH RX MED GY IP 250 OP 250 PS 637: Performed by: PHYSICIAN ASSISTANT

## 2018-10-17 PROCEDURE — 80048 BASIC METABOLIC PNL TOTAL CA: CPT | Performed by: PHYSICIAN ASSISTANT

## 2018-10-17 PROCEDURE — 71046 X-RAY EXAM CHEST 2 VIEWS: CPT

## 2018-10-17 PROCEDURE — 99285 EMERGENCY DEPT VISIT HI MDM: CPT | Mod: 25

## 2018-10-17 PROCEDURE — 84484 ASSAY OF TROPONIN QUANT: CPT | Performed by: PHYSICIAN ASSISTANT

## 2018-10-17 PROCEDURE — 93005 ELECTROCARDIOGRAM TRACING: CPT

## 2018-10-17 PROCEDURE — 96374 THER/PROPH/DIAG INJ IV PUSH: CPT

## 2018-10-17 PROCEDURE — 25000128 H RX IP 250 OP 636: Performed by: PHYSICIAN ASSISTANT

## 2018-10-17 PROCEDURE — 85379 FIBRIN DEGRADATION QUANT: CPT | Performed by: PHYSICIAN ASSISTANT

## 2018-10-17 PROCEDURE — 96375 TX/PRO/DX INJ NEW DRUG ADDON: CPT

## 2018-10-17 RX ORDER — LORAZEPAM 0.5 MG/1
0.5 TABLET ORAL ONCE
Status: COMPLETED | OUTPATIENT
Start: 2018-10-17 | End: 2018-10-17

## 2018-10-17 RX ORDER — OXYCODONE AND ACETAMINOPHEN 5; 325 MG/1; MG/1
1-2 TABLET ORAL EVERY 4 HOURS PRN
Qty: 6 TABLET | Refills: 0 | Status: SHIPPED | OUTPATIENT
Start: 2018-10-17 | End: 2018-12-25

## 2018-10-17 RX ORDER — HYDROMORPHONE HYDROCHLORIDE 1 MG/ML
0.5 INJECTION, SOLUTION INTRAMUSCULAR; INTRAVENOUS; SUBCUTANEOUS ONCE
Status: COMPLETED | OUTPATIENT
Start: 2018-10-17 | End: 2018-10-17

## 2018-10-17 RX ORDER — KETOROLAC TROMETHAMINE 15 MG/ML
15 INJECTION, SOLUTION INTRAMUSCULAR; INTRAVENOUS ONCE
Status: COMPLETED | OUTPATIENT
Start: 2018-10-17 | End: 2018-10-17

## 2018-10-17 RX ORDER — ASPIRIN 81 MG/1
243 TABLET, CHEWABLE ORAL ONCE
Status: COMPLETED | OUTPATIENT
Start: 2018-10-17 | End: 2018-10-17

## 2018-10-17 RX ORDER — LISINOPRIL 10 MG/1
10 TABLET ORAL
COMMUNITY

## 2018-10-17 RX ORDER — NITROGLYCERIN 0.4 MG/1
0.4 TABLET SUBLINGUAL ONCE
Status: COMPLETED | OUTPATIENT
Start: 2018-10-17 | End: 2018-10-17

## 2018-10-17 RX ORDER — KETOROLAC TROMETHAMINE 15 MG/ML
15 INJECTION, SOLUTION INTRAMUSCULAR; INTRAVENOUS ONCE
Status: DISCONTINUED | OUTPATIENT
Start: 2018-10-17 | End: 2018-10-17

## 2018-10-17 RX ADMIN — NITROGLYCERIN 0.4 MG: 0.4 TABLET SUBLINGUAL at 21:45

## 2018-10-17 RX ADMIN — LORAZEPAM 0.5 MG: 0.5 TABLET ORAL at 22:06

## 2018-10-17 RX ADMIN — Medication 0.5 MG: at 23:32

## 2018-10-17 RX ADMIN — KETOROLAC TROMETHAMINE 15 MG: 15 INJECTION, SOLUTION INTRAMUSCULAR; INTRAVENOUS at 22:06

## 2018-10-17 RX ADMIN — ASPIRIN 81 MG 243 MG: 81 TABLET ORAL at 21:45

## 2018-10-17 ASSESSMENT — ENCOUNTER SYMPTOMS
SHORTNESS OF BREATH: 1
COUGH: 0

## 2018-10-17 NOTE — ED AVS SNAPSHOT
[facility] Emergency Department    [address]    [facility] [address]    Phone:  [phone]    Fax:  [phone]                                       Jenifer Delong   MRN: [id number]    Department:  [facility] Emergency Department   Date of Visit:  10/17/2018           After Visit Summary Signature Page     I have received my discharge instructions, and my questions have been answered. I have discussed any challenges I see with this plan with the nurse or doctor.    ..........................................................................................................................................  Patient/Patient Representative Signature      ..........................................................................................................................................  Patient Representative Print Name and Relationship to Patient    ..................................................               ................................................  Date                                   Time    ..........................................................................................................................................  Reviewed by Signature/Title    ...................................................              ..............................................  Date                                               Time          22EPIC Rev 08/18

## 2018-10-17 NOTE — ED AVS SNAPSHOT
[facility]    [address] [facility] [address]    Phone:  [phone]    Fax:  [phone]                                       Jenifer Delong   MRN: [id number]    Department:  [facility]   Date of Visit:  10/17/2018           Patient Information     Date Of Birth          1970        Your diagnoses for this visit were:     Chest pain, unspecified type        You were seen by Roney Riggs MD.      Follow-up Information     Follow up with [facility].    Specialty:  EMERGENCY MEDICINE    Why:  As needed, If symptoms worsen    Contact information:    [address]  [facility] [address] [phone]        Follow up with Debbi Quintero MD In 2 days.    Contact information:    [facility]  [address] [facility] [id number] [phone]          Discharge Instructions       Discharge Instructions  Chest Pain    You have been seen today for chest pain or discomfort.  At this time, your provider has found no signs that your chest pain is due to a serious or life-threatening condition, (or you have declined more testing and/or admission to the hospital). However, sometimes there is a serious problem that does not show up right away. Your evaluation today may not be complete and you may need further testing and evaluation.     Generally, every Emergency Department visit should have a follow-up clinic visit with either a primary or a specialty clinic/provider. Please follow-up as instructed by your emergency provider today.  Return to the Emergency Department if:    Your chest pain changes, gets worse, starts to happen more often, or comes with less activity.    You are newly short of breath.    You get very weak or tired.    You pass out or faint.    You have any new symptoms, like fever, cough, numb legs, or you cough up blood.    You have anything else that  worries you.    Until you follow-up with your regular provider, please do the following:    Take one aspirin daily unless you have an allergy or are told not to by your provider.    If a stress test appointment has been made, go to the appointment.    If you have questions, contact your regular provider.    Follow-up with your regular provider/clinic as directed; this is very important.    If you were given a prescription for medicine here today, be sure to read all of the information (including the package insert) that comes with your prescription.  This will include important information about the medicine, its side effects, and any warnings that you need to know about.  The pharmacist who fills the prescription can provide more information and answer questions you may have about the medicine.  If you have questions or concerns that the pharmacist cannot address, please call or return to the Emergency Department.       Remember that you can always come back to the Emergency Department if you are not able to see your regular provider in the amount of time listed above, if you get any new symptoms, or if there is anything that worries you.      24 Hour Appointment Hotline       To make an appointment at any [facility], call [phone] ([phone]). If you don't have a family doctor or clinic, we will help you find one. [place] clinics are conveniently located to serve the needs of you and your family.             Review of your medicines      START taking        Dose / Directions Last dose taken    oxyCODONE-acetaminophen 5-325 MG per tablet   Commonly known as:  PERCOCET   Dose:  1-2 tablet   Quantity:  6 tablet        Take 1-2 tablets by mouth every 4 hours as needed for pain   Refills:  0          Our records show that you are taking the medicines listed below. If these are incorrect, please call your family doctor or clinic.        Dose / Directions Last dose taken    CARVEDILOL PO        Refills:   0        EPINEPHrine 0.3 MG/0.3ML injection   Commonly known as:  EPIPEN 2-ADIS   Dose:  0.3 mg   Quantity:  1 each        Inject 0.3 mLs (0.3 mg) into the muscle once as needed for anaphylaxis (For sudden onset of difficulty breathing and or mouth/throat swelling)   Refills:  0        LISINOPRIL PO   Dose:  10 mg        Take 10 mg by mouth   Refills:  0        MIRENA (52 MG) 20 MCG/24HR IUD   Dose:  1 each   Generic drug:  levonorgestrel        1 each by Intrauterine route once   Refills:  0        NORVASC 5 MG tablet   Dose:  5 mg   Generic drug:  amLODIPine        Take 5 mg by mouth daily   Refills:  0        TYLENOL PO   Dose:  1000 mg        Take 1,000 mg by mouth as needed for mild pain or fever   Refills:  0        venlafaxine 150 MG 24 hr capsule   Commonly known as:  EFFEXOR-XR        Refills:  0        WELLBUTRIN PO        Refills:  0                Information about OPIOIDS     PRESCRIPTION OPIOIDS: WHAT YOU NEED TO KNOW   We gave you an opioid (narcotic) pain medicine. It is important to manage your pain, but opioids are not always the best choice. You should first try all the other options your care team gave you. Take this medicine for as short a time (and as few doses) as possible.    Some activities can increase your pain, such as bandage changes or therapy sessions. It may help to take your pain medicine 30 to 60 minutes before these activities. Reduce your stress by getting enough sleep, working on hobbies you enjoy and practicing relaxation or meditation. Talk to your care team about ways to manage your pain beyond prescription opioids.    These medicines have risks:    DO NOT drive when on new or higher doses of pain medicine. These medicines can affect your alertness and reaction times, and you could be arrested for driving under the influence (DUI). If you need to use opioids long-term, talk to your care team about driving.    DO NOT operate heavy machinery    DO NOT do any other dangerous  activities while taking these medicines.    DO NOT drink any alcohol while taking these medicines.     If the opioid prescribed includes acetaminophen, DO NOT take with any other medicines that contain acetaminophen. Read all labels carefully. Look for the word  acetaminophen  or  Tylenol.  Ask your pharmacist if you have questions or are unsure.    You can get addicted to pain medicines, especially if you have a history of addiction (chemical, alcohol or substance dependence). Talk to your care team about ways to reduce this risk.    All opioids tend to cause constipation. Drink plenty of water and eat foods that have a lot of fiber, such as fruits, vegetables, prune juice, apple juice and high-fiber cereal. Take a laxative (Miralax, milk of magnesia, Colace, Senna) if you don t move your bowels at least every other day. Other side effects include upset stomach, sleepiness, dizziness, throwing up, tolerance (needing more of the medicine to have the same effect), physical dependence and slowed breathing.    Store your pills in a secure place, locked if possible. We will not replace any lost or stolen medicine. If you don t finish your medicine, please throw away (dispose) as directed by your pharmacist. The [place] [employer] has more information about safe disposal: https://www.pca.state.[place]/living-green/managing-unwanted-medications        Prescriptions were sent or printed at these locations (1 Prescription)                   Other Prescriptions                Printed at Department/Unit printer (1 of 1)         oxyCODONE-acetaminophen (PERCOCET) 5-325 MG per tablet                Procedures and tests performed during your visit     Basic metabolic panel    CBC with platelets differential    Chest XR,  PA & LAT    D dimer quantitative    EKG 12 lead    Troponin I      Orders Needing Specimen Collection     None      Pending Results     No orders found for last 3 day(s).            Pending Culture  Results     No orders found for last 3 day(s).            Pending Results Instructions     If you had any lab results that were not finalized at the time of your Discharge, you can call the ED Lab Result RN at [phone]. You will be contacted by this team for any positive Lab results or changes in treatment. The nurses are available 7 days a week from 10A to 6:30P.  You can leave a message 24 hours per day and they will return your call.        Test Results From Your Hospital Stay        10/17/2018  9:56 PM      Component Results     Component Value Ref Range & Units Status    WBC 7.7 4.0 - 11.0 10e9/L Final    RBC Count 4.43 3.8 - 5.2 10e12/L Final    Hemoglobin 13.5 11.7 - 15.7 g/dL Final    Hematocrit 39.8 35.0 - 47.0 % Final    MCV 90 78 - 100 fl Final    MCH 30.5 26.5 - 33.0 pg Final    MCHC 33.9 31.5 - 36.5 g/dL Final    RDW 12.5 10.0 - 15.0 % Final    Platelet Count 344 150 - 450 10e9/L Final    Diff Method Automated Method  Final    % Neutrophils 64.5 % Final    % Lymphocytes 25.5 % Final    % Monocytes 6.1 % Final    % Eosinophils 3.1 % Final    % Basophils 0.3 % Final    % Immature Granulocytes 0.5 % Final    Nucleated RBCs 0 0 /100 Final    Absolute Neutrophil 5.0 1.6 - 8.3 10e9/L Final    Absolute Lymphocytes 2.0 0.8 - 5.3 10e9/L Final    Absolute Monocytes 0.5 0.0 - 1.3 10e9/L Final    Absolute Eosinophils 0.2 0.0 - 0.7 10e9/L Final    Absolute Basophils 0.0 0.0 - 0.2 10e9/L Final    Abs Immature Granulocytes 0.0 0 - 0.4 10e9/L Final    Absolute Nucleated RBC 0.0  Final         10/17/2018 10:14 PM      Component Results     Component Value Ref Range & Units Status    D Dimer 0.5 0.0 - 0.50 ug/ml FEU Final    This D-dimer assay is intended for use in conjunction with a clinical pretest   probability assessment model to exclude pulmonary embolism (PE) and deep   venous thrombosis (DVT) in outpatients suspected of PE or DVT. The cut-off   value is 0.5 ug/mL FEU.           10/17/2018 10:38 PM       Component Results     Component Value Ref Range & Units Status    Sodium 138 133 - 144 mmol/L Final    Potassium 3.9 3.4 - 5.3 mmol/L Final    Specimen slightly hemolyzed, potassium may be falsely elevated    Chloride 105 94 - 109 mmol/L Final    Carbon Dioxide 26 20 - 32 mmol/L Final    Anion Gap 7 3 - 14 mmol/L Final    Glucose 131 (H) 70 - 99 mg/dL Final    Urea Nitrogen 17 7 - 30 mg/dL Final    Creatinine 0.86 0.52 - 1.04 mg/dL Final    GFR Estimate 71 >60 mL/min/1.7m2 Final    Non  GFR Calc    GFR Estimate If Black 85 >60 mL/min/1.7m2 Final    African American GFR Calc    Calcium 8.3 (L) 8.5 - 10.1 mg/dL Final         10/17/2018 10:38 PM      Component Results     Component Value Ref Range & Units Status    Troponin I ES <0.015 0.000 - 0.045 ug/L Final    The 99th percentile for upper reference range is 0.045 ug/L.  Troponin values   in the range of 0.045 - 0.120 ug/L may be associated with risks of adverse   clinical events.           10/17/2018 10:48 PM      Narrative     XR CHEST 2 VW 10/17/2018 10:43 PM     HISTORY: chest pain;         Impression     IMPRESSION: Negative exam.    NAYELI CHAPARRO MD                Clinical Quality Measure: Blood Pressure Screening     Your blood pressure was checked while you were in the emergency department today. The last reading we obtained was  BP: 111/72 . Please read the guidelines below about what these numbers mean and what you should do about them.  If your systolic blood pressure (the top number) is less than 120 and your diastolic blood pressure (the bottom number) is less than 80, then your blood pressure is normal. There is nothing more that you need to do about it.  If your systolic blood pressure (the top number) is 120-139 or your diastolic blood pressure (the bottom number) is 80-89, your blood pressure may be higher than it should be. You should have your blood pressure rechecked within a year by a primary care provider.  If your systolic  blood pressure (the top number) is 140 or greater or your diastolic blood pressure (the bottom number) is 90 or greater, you may have high blood pressure. High blood pressure is treatable, but if left untreated over time it can put you at risk for heart attack, stroke, or kidney failure. You should have your blood pressure rechecked by a primary care provider within the next 4 weeks.  If your provider in the emergency department today gave you specific instructions to follow-up with your doctor or provider even sooner than that, you should follow that instruction and not wait for up to 4 weeks for your follow-up visit.        Thank you for choosing [facility]       Thank you for choosing [facility] for your care. Our goal is always to provide you with excellent care. Hearing back from our patients is one way we can continue to improve our services. Please take a few minutes to complete the written survey that you may receive in the mail after you visit with us. Thank you!        [employer] Information     [employer] gives you secure access to your electronic health record. If you see a primary care provider, you can also send messages to your care team and make appointments. If you have questions, please call your primary care clinic.  If you do not have a primary care provider, please call [phone] and they will assist you.        Care EveryWhere ID     This is your Care EveryWhere ID. This could be used by other organizations to access your [facility] medical records  [id number]        Equal Access to Services     LAURA YBARRA : Hadii shin Broderick, waaxda luqadaha, qaybta kaalmagalina rubin, cornelio poole. So [facility] [phone].    ATENCIÓN: Si habla español, tiene a phelps disposición servicios gratuitos de asistencia lingüística. Llame al [phone].    We comply with applicable federal civil rights laws and [place] laws. We do not discriminate on the basis of race, color,  national origin, age, disability, sex, sexual orientation, or gender identity.            After Visit Summary       This is your record. Keep this with you and show to your community pharmacist(s) and doctor(s) at your next visit.

## 2018-10-18 VITALS
SYSTOLIC BLOOD PRESSURE: 111 MMHG | WEIGHT: 212.3 LBS | BODY MASS INDEX: 33.25 KG/M2 | TEMPERATURE: 98.5 F | DIASTOLIC BLOOD PRESSURE: 72 MMHG | HEART RATE: 86 BPM | RESPIRATION RATE: 18 BRPM | OXYGEN SATURATION: 97 %

## 2018-10-18 LAB — INTERPRETATION ECG - MUSE: NORMAL

## 2018-10-18 NOTE — ED PROVIDER NOTES
Emergency Department Attending Supervision Note  10/17/2018  11:07 PM      I evaluated this patient in conjunction with Sanchez CASTILLO      Briefly, the patient presented with a 1 day history of chest pain.  Patient was in her normal state of health until she woke early this morning with a sense of pressure in her anterior chest.  Since that time she has had no pain-free episodes and pain is actually worsened with time.  The pain does radiate to the left shoulder and neck.  Pain is worsened with deep inspiration but no change with exertion.  She has mild associated shortness of breath but no nausea or vomiting.  She did Tylenol and ibuprofen without remarkable improvement.  She does have a history of breast cancer but in remission.  No history of DVT, PE, unilateral leg swelling, mobilization, estrogen use.      On my exam:    General:   Pleasant, age appropriate female.  HEENT:    Oropharynx is moist  Eyes:    Conjunctiva normal  Neck:    Supple, no meningismus.     CV:     Regular rate and rhythm.      No murmurs, rubs or gallops.       2+ radial pulses bilateral.       No lower extremity edema.  PULM:    Clear to auscultation bilateral.       No respiratory distress.      Good air exchange.     No rales or wheezing.     Chest wall is tender and reproduces her pain.  ABD:    Soft, non-tender, non-distended.       No pulsatile masses.       No rebound, guarding or rigidity.  MSK:     No gross deformity to all four extremities.   LYMPH:   No cervical lymphadenopathy.  NEURO:   Alert. Good muscle tone, no atrophy.  Skin:    Warm, dry and intact.    Psych:    Anxious        EKG shows no ischemic changes, dysrhythmia or signs of pericarditis.  No signs of right heart strain.  History is less suggestive of ACS.  Despite greater than 8 hours of constant pain, troponin is within normal limits thus ruling out myocardial infarction.  Low suspicion for PE but did have pleuritic pain.  D-dimer sent and negative thus no  indication for CT scan the chest.  She has reproducible chest wall tenderness indicating likely musculoskeletal source.  Patient safe for discharge home with continued use of anti-inflammatories and close follow-up with primary care physician.    Diagnosis  (R07.9) Chest pain, unspecified type      MD Oneil Elise Jeremiah R, MD  10/17/18 [id number]

## 2018-10-18 NOTE — ED PROVIDER NOTES
History     Chief Complaint:  Chest Pain    HPI   Jenifer Delong is a 48 year old female with past history of hypertension, dyslipidemia, breast cancer in remission, who presents to the emergency department today with chest pain. The patient reports family history of death from heart problems with her brother and father passing away from heart attacks, as well as breast cancer history with chemotherapy that she says was damaging to her heart. She has had the pressure since she woke up this morning, that gets worse with deep breaths, and radiates to the left shoulder, and collarbone. The pain in the chest is constant, rated as 9/10 and nothing seems to make it better or worse.  She denies cough, leg swelling, coughing up blood. She did take baby aspirin as well as tylenol and ibuprofen, without relief.  Denies vomiting, nausea, or abdominal pain.  She has not had similar symptoms before, and her symptoms are unchanged with food or exertion.    Cardiac/PE/DVT Risk Factors:  History of hypertension - positive   History of hyperlipidemia - Negative  History of diabetes - negative   History of smoking - negative   Personal history of PE/DVT - negative   Family history of PE/DVT - negative   Family history of heart complications - positive   Recent travel - negative   Recent surgery - negative   Other immobilizations - negative   Cancer - positive      Allergies:  No Known Drug Allergies    Medications:    Norvasc  Wellbutrin  Carvedilol   Lisinopril   Tylenol  Epipen  Mirena   Effexor     Past Medical History:    Mirena IUD placed 1/10/14  Implanon removal  Night sweats  Female pelvic pain  Mechanical complication due to intrauterine contraceptive device  CTS (carpal tunnel syndrome)  Chemical dependency   Abnormal pap smear   Cancer  Hypertension  Mild major depression     Past Surgical History:    Breast surgery    Laparoscopy  Operative hysteroscopy     Family History:    CAD  Cancer  Cerebrovascular     Social  History:  The patient was accompanied to the ED by daughter.  Smoking Status: Never  Smokeless Tobacco: Never  Alcohol Use: No    Marital Status:      Review of Systems   Respiratory: Positive for shortness of breath. Negative for cough.    Cardiovascular: Positive for chest pain (radiates to left arm, jaw). Negative for leg swelling.   All other systems reviewed and are negative.    Physical Exam     Patient Vitals for the past 24 hrs:   BP Temp Temp src Pulse Heart Rate Resp SpO2 Weight   10/18/18 0015 110/79 - - - - - - -   10/17/18 2345 114/71 - - - - - 97 % -   10/17/18 2230 115/71 - - - 76 - 94 % -   10/17/18 2215 111/74 - - - 75 - 95 % -   10/17/18 2200 114/75 - - - 72 - 97 % -   10/17/18 2145 116/75 - - - 75 - 97 % -   10/17/18 2101 132/82 98.5  F (36.9  C) Temporal 86 - 18 98 % 96.3 kg (212 lb 4.9 oz)        Physical Exam  General: Alert and cooperative with exam. Normal mentation. Uncomfortable appearing.  Head:  Scalp is NC/AT  Eyes:  No scleral icterus, PERRL  ENT:  The external nose and ears are normal.   CV:  Regular rate and rhythm    No pathologic murmur, rubs, or gallops.  Resp:  Breath sounds are clear bilaterally.  No crackles, wheezes, rhonchi.    Non-labored, no retractions or accessory muscle use  GI:  Abdomen is soft, no distension, no tenderness. No peritoneal signs.  MS:  No lower extremity edema     Reproducible tenderness to palpation over anterior chest wall over sternum and costosternal joints.  Skin:  Warm and dry, No rash or lesions noted.  Neuro: Oriented x 3. No gross motor deficits.     Emergency Department Course     ECG:  Indication: Chest pain  Completed at 2051.  Read at 2054.   Normal sinus rhythm  Low voltage QRS  Borderline ECG    Rate 73 bpm. AL interval 154. QRS duration 74. QT/QTc 404/445. P-R-T axes 37 33 47.     Imaging:  Radiology findings were communicated with the patient who voiced understanding of the findings.  Chest XR  IMPRESSION: Negative exam.  Report  per radiology      Laboratory:  Laboratory findings were communicated with the patient who voiced understanding of the findings.  CBC: AWNL (WBC 7.7, HGB 13.5, )   D Dimer (Collected ): 0.5   BMP: 131(H) Glucose, 8.3(L) Ca o/w WNL (Creatinine 0.86)   Troponin (Collected ): <0.015     Interventions:  : Nitrostat 0.4mg Sublingual   : Aspirin 243mg PO   : Ativan 0.5mg IV   : Toradol 15mg IV injection    Emergency Department Course:  Nursing notes and vitals reviewed.  : I performed an exam of the patient as documented above.   IV was inserted and blood was drawn for laboratory testing, results above.  The patient was sent for a Chest XR while in the emergency department, results above.   Findings and plan explained to the Patient. Patient discharged home with instructions regarding supportive care, medications, and reasons to return. The importance of close follow-up was reviewed. The patient was prescribed medication as below.  I personally reviewed the laboratory and imaging results with the Patient and answered all related questions prior to discharge.     Impression & Plan      Medical Decision Makin-year-old female presenting with chest pain.  Patient history and records reviewed.  Broad differential was considered including: ACS, PE, pneumothorax, dissection, pneumonia, pericarditis, referred pain from abdomen.  EKG was obtained and was unremarkable.  Chest x-ray negative.  Lab work including troponin and d-dimer are both negative.  I strongly suspect the patient's symptoms are the result of musculoskeletal chest wall pain, given that she is significantly tender to palpation over the anterior chest and describes this is the same pain that she is having and her pain is pleuritic in nature.  Very low suspicion for acute coronary syndrome given the reproducible nature of the pain, not associated with exertion, EKG does not have ischemic changes, and troponin is negative  despite  constant chest pain for approximately 10 hours.  Low suspicion for pulmonary embolism, and d-dimer negative.  No mediastinal  widening on chest x-ray.  No abdominal tenderness or GI symptoms to suggest referred pain.  She was given symptomatic treatment in the ED as above with improvement in symptoms.  Discharged home with short course pain medication and instructed to use ibuprofen 1st line.  Stressed the importance of returning to the ED if any new or worsening symptoms develop, or if she experiences any exertional symptoms.    Diagnosis:    ICD-10-CM    1. Chest pain, unspecified type R07.9        Disposition:  discharged to home    Discharge Medications:  New Prescriptions    OXYCODONE-ACETAMINOPHEN (PERCOCET) 5-325 MG PER TABLET    Take 1-2 tablets by mouth every 4 hours as needed for pain     Scribe Disclosure:  I, Deedee Blackburn, am serving as a scribe at 9:20 PM on 10/17/2018 to document services personally performed by Sanchez Freeman PA-C based on my observations and the provider's statements to me.    10/17/2018   [facility] EMERGENCY DEPARTMENT       Sanchez Freeman PA-C  10/18/18 0038

## 2018-10-18 NOTE — ED TRIAGE NOTES
Pt reports mid chest pain and pressure since she woke up this morning. Pain is getting worse, radiates to left shoulder. Pt also feeling short of breath, pain is worse with deep breathing.

## 2018-12-18 ENCOUNTER — HOSPITAL ENCOUNTER (EMERGENCY)
Facility: CLINIC | Age: 48
Discharge: HOME | End: 2018-12-19
Attending: EMERGENCY MEDICINE | Admitting: EMERGENCY MEDICINE
Payer: COMMERCIAL

## 2018-12-18 ENCOUNTER — APPOINTMENT (OUTPATIENT)
Dept: ULTRASOUND IMAGING | Facility: CLINIC | Age: 48
End: 2018-12-18
Attending: EMERGENCY MEDICINE
Payer: COMMERCIAL

## 2018-12-18 VITALS
SYSTOLIC BLOOD PRESSURE: 147 MMHG | TEMPERATURE: 97.9 F | OXYGEN SATURATION: 100 % | DIASTOLIC BLOOD PRESSURE: 96 MMHG | RESPIRATION RATE: 16 BRPM

## 2018-12-18 DIAGNOSIS — M71.21 BAKER'S CYST OF KNEE, RIGHT: ICD-10-CM

## 2018-12-18 PROCEDURE — 25000132 ZZH RX MED GY IP 250 OP 250 PS 637: Performed by: EMERGENCY MEDICINE

## 2018-12-18 PROCEDURE — 99283 EMERGENCY DEPT VISIT LOW MDM: CPT

## 2018-12-18 PROCEDURE — 93971 EXTREMITY STUDY: CPT | Mod: RT

## 2018-12-18 RX ORDER — OXYCODONE HYDROCHLORIDE 5 MG/1
10 TABLET ORAL ONCE
Status: COMPLETED | OUTPATIENT
Start: 2018-12-18 | End: 2018-12-18

## 2018-12-18 RX ADMIN — OXYCODONE HYDROCHLORIDE 10 MG: 5 TABLET ORAL at 23:53

## 2018-12-18 ASSESSMENT — ENCOUNTER SYMPTOMS
FEVER: 0
CHILLS: 0
WOUND: 0
ARTHRALGIAS: 1
JOINT SWELLING: 1

## 2018-12-18 NOTE — ED AVS SNAPSHOT
[facility] Department  [address]  [facility] [address]  Phone:  [phone]  Fax:  [phone]                                    Jenifer Delong   MRN: [id number]    Department:  [facility] Emergency Department   Date of Visit:  12/18/2018           After Visit Summary Signature Page    I have received my discharge instructions, and my questions have been answered. I have discussed any challenges I see with this plan with the nurse or doctor.    ..........................................................................................................................................  Patient/Patient Representative Signature      ..........................................................................................................................................  Patient Representative Print Name and Relationship to Patient    ..................................................               ................................................  Date                                   Time    ..........................................................................................................................................  Reviewed by Signature/Title    ...................................................              ..............................................  Date                                               Time          22EPIC Rev 08/18

## 2018-12-18 NOTE — LETTER
December 19, 2018      To Whom It May Concern:      Jenifer Delong was seen in our Emergency Department today, 12/19/18.  I expect her condition to improve over the next 7-10 days.  She may return to work on 12/21 but cannot climb, crawl, squat for 7 days.  She will need to elevate the right leg frequently and may require use of crutches for 7 days. No heavy lifting greater than 20 pounds.    Sincerely,        Roney Riggs MD

## 2018-12-19 RX ORDER — TRAMADOL HYDROCHLORIDE 50 MG/1
50-100 TABLET ORAL EVERY 6 HOURS PRN
Qty: 15 TABLET | Refills: 0 | Status: SHIPPED | OUTPATIENT
Start: 2018-12-19 | End: 2018-12-22

## 2018-12-19 NOTE — ED TRIAGE NOTES
Pain behind right knee and upper calf. Started a few days ago and has been getting worse.  No known injury.

## 2018-12-19 NOTE — DISCHARGE INSTRUCTIONS
Please make an appointment to follow up with [facility] [phone] in 7-10 days if not improving.

## 2018-12-19 NOTE — ED PROVIDER NOTES
History     Chief Complaint:  Knee Pain    HPI   Jenifer Delong is a 48 year old female with a history of breast cancer and hypertension who presents to the ED for evaluation of knee pain. The patient reports that she developed an onset of right knee pain, localized behind the knee, and upper calf pain 2 days ago. This has worsened since the onset, so she presented to the ED for evaluation. Here in the ED, she states she had no known trauma to the area that led to her pain. She does state that that leg is more swollen than usual, and she is unable to extend it fully. She has been taking ibuprofen, tylenol, and aspirin, without any relief of the pain. She denies any history of surgery to this leg or knee. She denies any fevers, rashes, or other joint pain. She is having trouble ambulating secondary to the pain. Of note, she has been cancer free for 2 years.    PE/DVT Risk Factors:  The patient has a history of hypertension. The patient denies any personal or familial history of PE, DVT, or clotting disorder. The patient reports no recent travel, surgery, or other immobilizations.     Allergies:  NKDA    Medications:    Amlodipine  Atorvastatin  Carvedilol  Venlafaxine  Wellbutrin  Epinephrine  Mirena  Lisinopril    Past Medical History:    Breast Cancer  Depression  HTN  CTS  Chemical dependency    Past Surgical History:    Breast surgery   section  Diagnostic laparoscopy  Operative hysterectomy    Family History:    Cancer  CVD    Social History:  Marital Status:   [2]  Negative for tobacco use.  Rare alcohol use  Negative for drug use     Review of Systems   Constitutional: Negative for chills and fever.   Cardiovascular: Positive for leg swelling.   Musculoskeletal: Positive for arthralgias and joint swelling.   Skin: Negative for rash and wound.   All other systems reviewed and are negative.      Physical Exam     Patient Vitals for the past 24 hrs:   BP Temp Temp src Heart Rate Resp SpO2   18  2221 -- -- -- -- -- 100 %   12/18/18 2220 (!) 147/96 97.9  F (36.6  C) Temporal 78 16 --     Physical Exam    General:   Pleasant, age appropriate.      Resting comfortably in the bed.  Eyes:    Conjunctiva normal, PERRL  Neck:    Supple, no meningismus.     CV:     Regular rate and rhythm.      No murmurs, rubs or gallops.       No unilateral leg swelling.       2+ DP pulses bilaterally.       No lower extremity edema.  PULM:    Clear to auscultation bilateral.       No respiratory distress.      Good air exchange.  ABD:    Soft, non-tender, non-distended.       No pulsatile masses.       No rebound, guarding or rigidity.  MSK:     Right lower extremity:      No appreciable swelling.      Mild pain with compression of the calf.      Negative Dang's sign.      Compartments are soft and compressible.      Knee:        No bony tenderness to the knee       Moderate focal tenderness to popliteal fossa with generalized fullness        Limited ROM due to pain       No warmth or erythema  LYMPH:   No cervical lymphadenopathy.  NEURO:   Alert and oriented x 3.      Strength and sensation intact to the lower extremities.  Skin:    Warm, dry and intact.    Psych:    Mood is good and affect is appropriate.      Emergency Department Course     Imaging:  Radiographic findings were communicated with the patient who voiced understanding of the findings.  US Lower Extremity Venous Duplex, right:  1. No DVT identified right leg.  2. Complex cystic area in the popliteal fossa, probably a Baker's cyst, as per radiology.     Interventions:  6247 Oxycodone 10 mg PO    Emergency Department Course:  Nursing notes and vitals reviewed. (5276) I performed an exam of the patient as documented above.     Medicine administered as documented above.     The patient was sent for a Lower Extremity US while in the emergency department, findings above.     (0781) I reevaluated the patient and provided an update in regards to her ED course.      The  patient was provided crutches.    Findings and plan explained to the Patient. Patient discharged home with instructions regarding supportive care, medications, and reasons to return. The importance of close follow-up was reviewed. The patient was prescribed tramadol.    I personally reviewed the results with the Patient and answered all related questions prior to discharge.     Impression & Plan      Medical Decision Making:  eJnifer Delong is a 48 year old female presents the ED with atraumatic right knee and posterior calf pain.  She has no evidence of arterial insufficiency.  No evidence of septic or inflammatory arthritis.  No concern for underlying fracture or dislocation.  Doppler ultrasound reveals no evidence of DVT but does reveal evidence of Baker's cyst which is the likely source of the patient's pain.  Patient will continue use of anti-inflammatories as needed for pain as well as limited supply of tramadol.  Referral to orthopedic surgery if symptoms not improved within 1 week.    Diagnosis:    ICD-10-CM    1. Baker's cyst of knee, right M71.21      Disposition:  discharged to home    Discharge Medications:     Medication List      Started    traMADol 50 MG tablet  Commonly known as:  ULTRAM   mg, Oral, EVERY 6 HOURS PRN          Scribe Disclosure:  I, Peggy Monique, am serving as a scribe on 12/18/2018 at 11:25 PM to personally document services performed by Roney Riggs MD based on my observations and the provider's statements to me.     Peggy Monique  12/18/2018   [facility] EMERGENCY DEPARTMENT       Roney Riggs MD  12/19/18 3684

## 2018-12-25 ENCOUNTER — APPOINTMENT (OUTPATIENT)
Dept: ULTRASOUND IMAGING | Facility: CLINIC | Age: 48
End: 2018-12-25
Payer: COMMERCIAL

## 2018-12-25 ENCOUNTER — APPOINTMENT (OUTPATIENT)
Dept: GENERAL RADIOLOGY | Facility: CLINIC | Age: 48
End: 2018-12-25
Payer: COMMERCIAL

## 2018-12-25 ENCOUNTER — HOSPITAL ENCOUNTER (EMERGENCY)
Facility: CLINIC | Age: 48
Discharge: HOME | End: 2018-12-25
Admitting: PHYSICIAN ASSISTANT
Payer: COMMERCIAL

## 2018-12-25 VITALS
DIASTOLIC BLOOD PRESSURE: 77 MMHG | WEIGHT: 210 LBS | OXYGEN SATURATION: 98 % | HEART RATE: 81 BPM | BODY MASS INDEX: 32.89 KG/M2 | RESPIRATION RATE: 16 BRPM | SYSTOLIC BLOOD PRESSURE: 118 MMHG | TEMPERATURE: 97.8 F

## 2018-12-25 DIAGNOSIS — M71.21 BAKER CYST, RIGHT: ICD-10-CM

## 2018-12-25 DIAGNOSIS — M25.561 RIGHT KNEE PAIN, UNSPECIFIED CHRONICITY: ICD-10-CM

## 2018-12-25 PROCEDURE — 25000132 ZZH RX MED GY IP 250 OP 250 PS 637: Performed by: PHYSICIAN ASSISTANT

## 2018-12-25 PROCEDURE — 93971 EXTREMITY STUDY: CPT | Mod: RT

## 2018-12-25 PROCEDURE — 99284 EMERGENCY DEPT VISIT MOD MDM: CPT | Mod: 25

## 2018-12-25 PROCEDURE — 73562 X-RAY EXAM OF KNEE 3: CPT | Mod: RT

## 2018-12-25 RX ORDER — OXYCODONE HYDROCHLORIDE 5 MG/1
5 TABLET ORAL ONCE
Status: COMPLETED | OUTPATIENT
Start: 2018-12-25 | End: 2018-12-25

## 2018-12-25 RX ORDER — HYDROCODONE BITARTRATE AND ACETAMINOPHEN 5; 325 MG/1; MG/1
1 TABLET ORAL EVERY 6 HOURS PRN
Qty: 5 TABLET | Refills: 0 | Status: SHIPPED | OUTPATIENT
Start: 2018-12-25 | End: 2018-12-28

## 2018-12-25 RX ADMIN — OXYCODONE HYDROCHLORIDE 5 MG: 5 TABLET ORAL at 16:44

## 2018-12-25 NOTE — ED AVS SNAPSHOT
[facility] Emergency Department  [address]  [facility] [address]  Phone:  [phone]  Fax:  [phone]                                    Jenifer Delong   MRN: [id number]    Department:  [facility] Emergency Department   Date of Visit:  12/25/2018           After Visit Summary Signature Page    I have received my discharge instructions, and my questions have been answered. I have discussed any challenges I see with this plan with the nurse or doctor.    ..........................................................................................................................................  Patient/Patient Representative Signature      ..........................................................................................................................................  Patient Representative Print Name and Relationship to Patient    ..................................................               ................................................  Date                                   Time    ..........................................................................................................................................  Reviewed by Signature/Title    ...................................................              ..............................................  Date                                               Time          22EPIC Rev 08/18

## 2018-12-25 NOTE — ED TRIAGE NOTES
Pain in the right posterior knee.  Was seen for r/o DVT a week ago and US was normal and a baker cyst was present.  Pt continues to have worse pain in the knee.

## 2018-12-25 NOTE — ED PROVIDER NOTES
History     Chief Complaint:  Knee Pain      HPI   Jenifer Delong is a 48 year old female who presents with right knee pain which she describes as an external pressure. She is not able to stretch her leg completely, and a burning pain has developed over the past two days. She has tried Tylenol and Aspercreme which have not alleviated her symptoms. She was seen in the ED on 12/18/18 for the same pain where she underwent an ultrasound which showed a Baker's cyst. New pain behind the knee developed since that visit. She has not had any injury or surgery to that knee. She has also been using an ACE wrap and crutches to help with ambulating. She has not been able to get into see [facility] for follow-up due to the holiday.     PE/DVT Risk Factors   Personal History: Negative  Recent Travel: Negative   Recent Surgery/Hospitalization: Negative  Tobacco: Negative  Family History: Negative  Hormone Use: IUD  Cancer: Negative  Trauma: Negative      Allergies:  Hornets     Medications:    Acetaminophen  Atorvastatin  Bupropion  Carvedilol  Effexor   EpiPen  Lisinopril  Mirena     Past Medical History:    Cancer  Depression   HTN    Past Surgical History:    Breast surgery  C section  Laparscopy diagnostic (Gyn)  Operative hysteroscopy     Family History:    MI  Cancer  Cerebrovascular disease    Social History:  Presents to the ED alone.  Tobacco Use: Never  Alcohol Use: Rarely  PCP: Debbi Quintero     Review of Systems   Musculoskeletal:        Positive for right knee pain.    All other systems reviewed and are negative.    Physical Exam     Patient Vitals for the past 24 hrs:   BP Temp Temp src Pulse Heart Rate Resp SpO2 Weight   12/25/18 1800 118/77 -- -- -- 79 16 98 % --   12/25/18 1607 161/76 -- -- 81 -- -- 100 % --   12/25/18 1603 -- 97.8  F (36.6  C) Temporal -- -- 16 -- 95.3 kg (210 lb)     Physical Exam   General: Well appearing, well nourished.  Anxious appearing and occasionally tearful.   Skin: No overlying erythema or  warmth to the patient's knee.  No joint effusion.   HEENT: Head: Normocephalic, atraumatic, no visible masses.   Eyes: Conjunctiva clear.  Cardiac: Normal rate and regular rhythm, no murmur or gallop.   Lungs: Clear to auscultation.  Abdomen: Abdomen soft, nontender with palpation.   Musculoskeletal: Tenderness with palpation to the posterior right knee.  Tenderness extends throughout to the right calf. No significant swelling.  No tenderness palpation throughout the bony prominences of the right knee.  Patient able to fully flex and extend the knee with discomfort.  Unable to fully perform anterior and posterior drawer, valgus and varus, due to the patient's discomfort.  Full plantar flexion dorsiflexion of the right ankle.  Full range of motion of right hip without difficulty.   Neurologic: Oriented x 3. GCS: 15. Sensation intact throughout bilateral lower extremities.  Dorsalis pedis and posterior tibialis pulses intact bilaterally.  Unable to palpate right popliteal pulse.  Normal capillary refill in bilateral lower extremity digits.  Psychiatric: Intact recent and remote memory, judgment and insight, normal mood and affect.     Emergency Department Course     Imaging:  Radiographic findings were communicated with the patient who voiced understanding of the findings.    XR Knee Right 3 Views   Final Result   IMPRESSION: No fractures are seen in the RIGHT knee. Joint spaces are   preserved. No knee effusion identified.      MEAGAN DELATORRE MD      US Lower Extremity Venous Duplex Right   Final Result   IMPRESSION:    1. No DVT demonstrated.   2. RIGHT popliteal fossa cyst stable to slightly decreased in size   since comparison study.      MEAGAN DELATORRE MD        Interventions:  1644: Oxycodone, 5 mg, oral     Emergency Department Course:  The patient arrived in triage where vitals were measured and recorded.   The patient was then escorted back to the emergency department.   The patient's medical records were  reviewed.  Nursing notes and vitals were reviewed.  1623: I performed an exam of the patient as documented above.  The above workup was undertaken.  1643: I looked up the patient on the [place] prescription monitoring program website. On 12/19 she received 15 mg Tramadol, 10/26 she got 10 mg Norco and 6 mg Norco on 10/18. She has received 2 other pain prescriptions in 2018.     1746: I rechecked the patient and discussed results.    Findings and plan explained to the Patient. Patient discharged home, status improved, with instructions regarding supportive care, medications, and reasons to return as well as the importance of close follow-up was reviewed. Patient was prescribed Norco.       Impression & Plan      Medical Decision Making:  Jenifer Delong is a 48 year old female who presents the ED today for evaluation of right knee pain.  Details of the patient's history can be noted in HPI.  Differential diagnosis included DVT, Baker's cyst, sprain, strain, fracture, osteoarthritis, septic joint, gout, cellulitis, amongst others.  Upon my exam, patient was very anxious and tearful appearing.  There was no significant warmth or edema, no joint effusion to the right knee.  Patient did have a great deal of tenderness with palpation of the posterior aspect of the knee as well as flexion.  She is otherwise neurovascularly intact.  Due to possible delayed presentation of DVT, ultrasound was completed.  This returned showing a Baker's cyst, similar in size to previous imaging study.  X-ray was also completed.  This returned showing no acute bony abnormalities or other pathology.    Patient has had multiple narcotic prescriptions within the past few months.  I evaluated her on the [place] prescription monitoring program. I did give her 1 oral dose of oxycodone here today.  I do believe that she is in acute pain due to her Baker's cyst.  I gave her 5 additional Norco I told her to call [facility] orthopedics immediately in  the morning to schedule a follow-up appointment.  Patient is in agreement with this plan and notes that she is attempted to see provider at [facility] but has been unable to due to the holiday.  Low concern for septic joint due to no joint effusion, no fever, no redness or warmth.  She return to the ED for any changing or worsening symptoms, fevers, redness or warmth, new concerns.  She will continue to take ibuprofen, Ace bandage, crutches at home.  All questions were answered by the patient's discharge.  She was in agreement with the treatment plan as stated above.    Diagnosis:    ICD-10-CM    1. Right knee pain, unspecified chronicity M25.561    2. Dove cyst, right M71.21        Disposition:  Discharged to home.     Discharge Medications:     Medication List      Started    HYDROcodone-acetaminophen 5-325 MG tablet  Commonly known as:  NORCO  1 tablet, Oral, EVERY 6 HOURS PRN          Elena AMIN am serving as a scribe on 12/25/2018 at 4:23 PM to personally document services performed by Veronica Henao PA-C, based on my observations and the provider's statements to me.   [facility] EMERGENCY DEPARTMENT    This was created at least in part with a voice recognition software. Mistakes/typos may be present.        Veronica Henao PA  12/25/18 9905

## 2019-01-20 ENCOUNTER — HOSPITAL ENCOUNTER (EMERGENCY)
Facility: CLINIC | Age: 49
Discharge: HOME | End: 2019-01-20
Attending: EMERGENCY MEDICINE | Admitting: EMERGENCY MEDICINE
Payer: COMMERCIAL

## 2019-01-20 ENCOUNTER — APPOINTMENT (OUTPATIENT)
Dept: CT IMAGING | Facility: CLINIC | Age: 49
End: 2019-01-20
Payer: COMMERCIAL

## 2019-01-20 VITALS
HEART RATE: 80 BPM | RESPIRATION RATE: 16 BRPM | SYSTOLIC BLOOD PRESSURE: 125 MMHG | OXYGEN SATURATION: 97 % | DIASTOLIC BLOOD PRESSURE: 79 MMHG | TEMPERATURE: 98.1 F

## 2019-01-20 DIAGNOSIS — R11.2 NON-INTRACTABLE VOMITING WITH NAUSEA, UNSPECIFIED VOMITING TYPE: ICD-10-CM

## 2019-01-20 DIAGNOSIS — R07.81 RIB PAIN: ICD-10-CM

## 2019-01-20 LAB
ALBUMIN SERPL-MCNC: 3.8 G/DL (ref 3.4–5)
ALBUMIN UR-MCNC: NEGATIVE MG/DL
ALP SERPL-CCNC: 79 U/L (ref 40–150)
ALT SERPL W P-5'-P-CCNC: 21 U/L (ref 0–50)
AMORPH CRY #/AREA URNS HPF: ABNORMAL /HPF
ANION GAP SERPL CALCULATED.3IONS-SCNC: 7 MMOL/L (ref 3–14)
APPEARANCE UR: ABNORMAL
AST SERPL W P-5'-P-CCNC: 17 U/L (ref 0–45)
BASOPHILS # BLD AUTO: 0 10E9/L (ref 0–0.2)
BASOPHILS NFR BLD AUTO: 0.4 %
BILIRUB SERPL-MCNC: 0.4 MG/DL (ref 0.2–1.3)
BILIRUB UR QL STRIP: NEGATIVE
BUN SERPL-MCNC: 19 MG/DL (ref 7–30)
CALCIUM SERPL-MCNC: 8.8 MG/DL (ref 8.5–10.1)
CHLORIDE SERPL-SCNC: 108 MMOL/L (ref 94–109)
CO2 SERPL-SCNC: 27 MMOL/L (ref 20–32)
COLOR UR AUTO: YELLOW
CREAT SERPL-MCNC: 0.88 MG/DL (ref 0.52–1.04)
D DIMER PPP FEU-MCNC: 7.5 UG/ML FEU (ref 0–0.5)
DIFFERENTIAL METHOD BLD: NORMAL
EOSINOPHIL # BLD AUTO: 0.2 10E9/L (ref 0–0.7)
EOSINOPHIL NFR BLD AUTO: 2.7 %
ERYTHROCYTE [DISTWIDTH] IN BLOOD BY AUTOMATED COUNT: 11.9 % (ref 10–15)
GFR SERPL CREATININE-BSD FRML MDRD: 78 ML/MIN/{1.73_M2}
GLUCOSE SERPL-MCNC: 89 MG/DL (ref 70–99)
GLUCOSE UR STRIP-MCNC: NEGATIVE MG/DL
HCG SERPL QL: NEGATIVE
HCT VFR BLD AUTO: 40.8 % (ref 35–47)
HGB BLD-MCNC: 13.3 G/DL (ref 11.7–15.7)
HGB UR QL STRIP: NEGATIVE
IMM GRANULOCYTES # BLD: 0 10E9/L (ref 0–0.4)
IMM GRANULOCYTES NFR BLD: 0.4 %
KETONES UR STRIP-MCNC: NEGATIVE MG/DL
LEUKOCYTE ESTERASE UR QL STRIP: NEGATIVE
LIPASE SERPL-CCNC: 131 U/L (ref 73–393)
LYMPHOCYTES # BLD AUTO: 1.9 10E9/L (ref 0.8–5.3)
LYMPHOCYTES NFR BLD AUTO: 22.9 %
MCH RBC QN AUTO: 28.1 PG (ref 26.5–33)
MCHC RBC AUTO-ENTMCNC: 32.6 G/DL (ref 31.5–36.5)
MCV RBC AUTO: 86 FL (ref 78–100)
MONOCYTES # BLD AUTO: 0.5 10E9/L (ref 0–1.3)
MONOCYTES NFR BLD AUTO: 6.2 %
MUCOUS THREADS #/AREA URNS LPF: PRESENT /LPF
NEUTROPHILS # BLD AUTO: 5.7 10E9/L (ref 1.6–8.3)
NEUTROPHILS NFR BLD AUTO: 67.4 %
NITRATE UR QL: NEGATIVE
NRBC # BLD AUTO: 0 10*3/UL
NRBC BLD AUTO-RTO: 0 /100
PH UR STRIP: 8 PH (ref 5–7)
PLATELET # BLD AUTO: 371 10E9/L (ref 150–450)
POTASSIUM SERPL-SCNC: 3.6 MMOL/L (ref 3.4–5.3)
PROT SERPL-MCNC: 7.6 G/DL (ref 6.8–8.8)
RBC # BLD AUTO: 4.73 10E12/L (ref 3.8–5.2)
RBC #/AREA URNS AUTO: 0 /HPF (ref 0–2)
SODIUM SERPL-SCNC: 142 MMOL/L (ref 133–144)
SOURCE: ABNORMAL
SP GR UR STRIP: 1.02 (ref 1–1.03)
SQUAMOUS #/AREA URNS AUTO: 2 /HPF (ref 0–1)
TROPONIN I SERPL-MCNC: <0.015 UG/L (ref 0–0.04)
UROBILINOGEN UR STRIP-MCNC: 0 MG/DL (ref 0–2)
WBC # BLD AUTO: 8.5 10E9/L (ref 4–11)
WBC #/AREA URNS AUTO: 0 /HPF (ref 0–5)

## 2019-01-20 PROCEDURE — 84703 CHORIONIC GONADOTROPIN ASSAY: CPT | Performed by: EMERGENCY MEDICINE

## 2019-01-20 PROCEDURE — 84484 ASSAY OF TROPONIN QUANT: CPT | Performed by: EMERGENCY MEDICINE

## 2019-01-20 PROCEDURE — 81001 URINALYSIS AUTO W/SCOPE: CPT | Performed by: EMERGENCY MEDICINE

## 2019-01-20 PROCEDURE — 93005 ELECTROCARDIOGRAM TRACING: CPT

## 2019-01-20 PROCEDURE — 85025 COMPLETE CBC W/AUTO DIFF WBC: CPT | Performed by: EMERGENCY MEDICINE

## 2019-01-20 PROCEDURE — 80053 COMPREHEN METABOLIC PANEL: CPT | Performed by: EMERGENCY MEDICINE

## 2019-01-20 PROCEDURE — 25000128 H RX IP 250 OP 636: Performed by: EMERGENCY MEDICINE

## 2019-01-20 PROCEDURE — 96375 TX/PRO/DX INJ NEW DRUG ADDON: CPT

## 2019-01-20 PROCEDURE — 96374 THER/PROPH/DIAG INJ IV PUSH: CPT | Mod: 59

## 2019-01-20 PROCEDURE — 99285 EMERGENCY DEPT VISIT HI MDM: CPT | Mod: 25

## 2019-01-20 PROCEDURE — 71260 CT THORAX DX C+: CPT

## 2019-01-20 PROCEDURE — 85379 FIBRIN DEGRADATION QUANT: CPT | Performed by: EMERGENCY MEDICINE

## 2019-01-20 PROCEDURE — 83690 ASSAY OF LIPASE: CPT | Performed by: EMERGENCY MEDICINE

## 2019-01-20 RX ORDER — ONDANSETRON 4 MG/1
4 TABLET, ORALLY DISINTEGRATING ORAL EVERY 8 HOURS PRN
Qty: 10 TABLET | Refills: 0 | Status: SHIPPED | OUTPATIENT
Start: 2019-01-20 | End: 2019-01-23

## 2019-01-20 RX ORDER — IOPAMIDOL 755 MG/ML
500 INJECTION, SOLUTION INTRAVASCULAR ONCE
Status: COMPLETED | OUTPATIENT
Start: 2019-01-20 | End: 2019-01-20

## 2019-01-20 RX ORDER — ACETAMINOPHEN 325 MG/1
650 TABLET ORAL EVERY 6 HOURS PRN
Qty: 20 TABLET | Refills: 0 | Status: SHIPPED | OUTPATIENT
Start: 2019-01-20 | End: 2019-02-19

## 2019-01-20 RX ORDER — KETOROLAC TROMETHAMINE 15 MG/ML
15 INJECTION, SOLUTION INTRAMUSCULAR; INTRAVENOUS ONCE
Status: COMPLETED | OUTPATIENT
Start: 2019-01-20 | End: 2019-01-20

## 2019-01-20 RX ORDER — MORPHINE SULFATE 4 MG/ML
4 INJECTION, SOLUTION INTRAMUSCULAR; INTRAVENOUS ONCE
Status: COMPLETED | OUTPATIENT
Start: 2019-01-20 | End: 2019-01-20

## 2019-01-20 RX ADMIN — MORPHINE SULFATE 4 MG: 4 INJECTION INTRAVENOUS at 17:43

## 2019-01-20 RX ADMIN — IOPAMIDOL 81 ML: 755 INJECTION, SOLUTION INTRAVENOUS at 19:02

## 2019-01-20 RX ADMIN — SODIUM CHLORIDE 98 ML: 9 INJECTION, SOLUTION INTRAVENOUS at 19:02

## 2019-01-20 RX ADMIN — KETOROLAC TROMETHAMINE 15 MG: 15 INJECTION, SOLUTION INTRAMUSCULAR; INTRAVENOUS at 17:43

## 2019-01-20 ASSESSMENT — ENCOUNTER SYMPTOMS
VOMITING: 1
ABDOMINAL PAIN: 0
ARTHRALGIAS: 1
CONSTIPATION: 0
DIARRHEA: 0
BACK PAIN: 1
DYSURIA: 0
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
## 2019-01-20 NOTE — ED AVS SNAPSHOT
[facility]  [address]  [facility] [address]  Phone:  [phone]  Fax:  [phone]                                    Jenifer Delong   MRN: [id number]    Department:  [facility]   Date of Visit:  1/20/2019           After Visit Summary Signature Page    I have received my discharge instructions, and my questions have been answered. I have discussed any challenges I see with this plan with the nurse or doctor.    ..........................................................................................................................................  Patient/Patient Representative Signature      ..........................................................................................................................................  Patient Representative Print Name and Relationship to Patient    ..................................................               ................................................  Date                                   Time    ..........................................................................................................................................  Reviewed by Signature/Title    ...................................................              ..............................................  Date                                               Time          22EPIC Rev 08/18
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
## 2019-01-21 LAB — INTERPRETATION ECG - MUSE: NORMAL

## 2019-01-21 ASSESSMENT — ENCOUNTER SYMPTOMS
WEAKNESS: 0
CHILLS: 1
FEVER: 0
NAUSEA: 1
HEADACHES: 0
COUGH: 1
SHORTNESS OF BREATH: 0
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
## 2020-02-16 ENCOUNTER — HEALTH MAINTENANCE LETTER (OUTPATIENT)
Age: 50
End: 2020-02-16

## 2020-02-20 ENCOUNTER — NURSE TRIAGE (OUTPATIENT)
Dept: NURSING | Facility: CLINIC | Age: 50
End: 2020-02-20

## 2020-02-21 NOTE — TELEPHONE ENCOUNTER
Not a [facility] patient - Patient was playing with puppy tonight and it's tooth went into her nose and has approx 1/8 inch cut on the curved inside part of nose. Has stopped bleeding now - per protocol, advised ED evaluation (or UC if smaller than 1/8 inch - patient stated she would measure and plan accordingly).     Arabella Mackenzie RN on 2/20/2020 at 7:45 PM    Reason for Disposition    [1] Cut (length > 1/8 inch or 3 mm) or skin tear AND[2] any animal    Additional Information    Negative: [1] Major bleeding (e.g., actively dripping or spurting) AND [2] can't be stopped    Negative: Fainted or too weak to stand following large blood loss    Negative: Knocked out (unconscious) > 1 minute    Negative: Difficult to awaken or acting confused (e.g., disoriented, slurred speech)    Negative: Severe neck pain    Negative: Sounds like a life-threatening emergency to the triager    Negative: Wound looks infected    Negative: Nosebleed not from trauma    Negative: [1] Nosebleed AND [2] won't stop after 10 minutes of pinching the nostrils closed (applied twice)    Negative: [1] Skin bleeding AND [2] won't stop after 10 minutes of direct pressure    Negative: Skin is split open or gaping  (or length > 1/4 inch or 6 mm)    Negative: Very deformed or crooked nose    Negative: Sounds like a serious injury to the triager    Negative: Clear fluid is dripping from the nose    Negative: [1] SEVERE pain AND [2] not improved 2 hours after pain medicine/ice packs    Negative: Black and blue skin around both eyes (i.e., bilateral periorbital ecchymosis)    Negative: Breathing through the nose is blocked on one side or both sides    Negative: [1] After day 2 AND [2] nose pain becomes worse AND [3] unexplained fever occurs    Negative: Suspicious history for the injury    Negative: [1] Major bleeding (e.g., actively dripping or spurting) AND [2] can't be stopped    Negative: Sounds like a life-threatening emergency to the triager    Negative:  Snake bite    Negative: Bite, wound, or sting from fish    Protocols used: ANIMAL BITE-A-AH, NOSE INJURY-A-AH

## 2020-04-14 ENCOUNTER — APPOINTMENT (OUTPATIENT)
Dept: CT IMAGING | Facility: CLINIC | Age: 50
End: 2020-04-14
Attending: EMERGENCY MEDICINE
Payer: COMMERCIAL

## 2020-04-14 ENCOUNTER — HOSPITAL ENCOUNTER (EMERGENCY)
Facility: CLINIC | Age: 50
Discharge: HOME | End: 2020-04-14
Attending: EMERGENCY MEDICINE | Admitting: EMERGENCY MEDICINE
Payer: COMMERCIAL

## 2020-04-14 VITALS
BODY MASS INDEX: 31.01 KG/M2 | DIASTOLIC BLOOD PRESSURE: 88 MMHG | RESPIRATION RATE: 20 BRPM | WEIGHT: 198 LBS | OXYGEN SATURATION: 95 % | SYSTOLIC BLOOD PRESSURE: 142 MMHG | TEMPERATURE: 98.4 F | HEART RATE: 90 BPM

## 2020-04-14 DIAGNOSIS — M54.50 ACUTE RIGHT-SIDED LOW BACK PAIN WITHOUT SCIATICA: ICD-10-CM

## 2020-04-14 LAB
ALBUMIN SERPL-MCNC: 3.9 G/DL (ref 3.4–5)
ALBUMIN UR-MCNC: NEGATIVE MG/DL
ALP SERPL-CCNC: 85 U/L (ref 40–150)
ALT SERPL W P-5'-P-CCNC: 29 U/L (ref 0–50)
ANION GAP SERPL CALCULATED.3IONS-SCNC: 9 MMOL/L (ref 3–14)
APPEARANCE UR: CLEAR
AST SERPL W P-5'-P-CCNC: 25 U/L (ref 0–45)
BACTERIA #/AREA URNS HPF: ABNORMAL /HPF
BASOPHILS # BLD AUTO: 0 10E9/L (ref 0–0.2)
BASOPHILS NFR BLD AUTO: 0.3 %
BILIRUB SERPL-MCNC: 0.3 MG/DL (ref 0.2–1.3)
BILIRUB UR QL STRIP: NEGATIVE
BUN SERPL-MCNC: 21 MG/DL (ref 7–30)
CALCIUM SERPL-MCNC: 9.5 MG/DL (ref 8.5–10.1)
CHLORIDE SERPL-SCNC: 107 MMOL/L (ref 94–109)
CO2 SERPL-SCNC: 25 MMOL/L (ref 20–32)
COLOR UR AUTO: ABNORMAL
CREAT SERPL-MCNC: 0.76 MG/DL (ref 0.52–1.04)
D DIMER PPP FEU-MCNC: 0.3 UG/ML FEU (ref 0–0.5)
DIFFERENTIAL METHOD BLD: NORMAL
EOSINOPHIL # BLD AUTO: 0.5 10E9/L (ref 0–0.7)
EOSINOPHIL NFR BLD AUTO: 4.8 %
ERYTHROCYTE [DISTWIDTH] IN BLOOD BY AUTOMATED COUNT: 13.2 % (ref 10–15)
GFR SERPL CREATININE-BSD FRML MDRD: >90 ML/MIN/{1.73_M2}
GLUCOSE SERPL-MCNC: 132 MG/DL (ref 70–99)
GLUCOSE UR STRIP-MCNC: NEGATIVE MG/DL
HCG SERPL QL: NEGATIVE
HCT VFR BLD AUTO: 42.5 % (ref 35–47)
HGB BLD-MCNC: 13.4 G/DL (ref 11.7–15.7)
HGB UR QL STRIP: ABNORMAL
HYALINE CASTS #/AREA URNS LPF: 1 /LPF (ref 0–2)
IMM GRANULOCYTES # BLD: 0.1 10E9/L (ref 0–0.4)
IMM GRANULOCYTES NFR BLD: 0.7 %
KETONES UR STRIP-MCNC: NEGATIVE MG/DL
LEUKOCYTE ESTERASE UR QL STRIP: ABNORMAL
LIPASE SERPL-CCNC: 172 U/L (ref 73–393)
LYMPHOCYTES # BLD AUTO: 1.9 10E9/L (ref 0.8–5.3)
LYMPHOCYTES NFR BLD AUTO: 19.4 %
MCH RBC QN AUTO: 27.3 PG (ref 26.5–33)
MCHC RBC AUTO-ENTMCNC: 31.5 G/DL (ref 31.5–36.5)
MCV RBC AUTO: 87 FL (ref 78–100)
MONOCYTES # BLD AUTO: 0.7 10E9/L (ref 0–1.3)
MONOCYTES NFR BLD AUTO: 7.6 %
MUCOUS THREADS #/AREA URNS LPF: PRESENT /LPF
NEUTROPHILS # BLD AUTO: 6.5 10E9/L (ref 1.6–8.3)
NEUTROPHILS NFR BLD AUTO: 67.2 %
NITRATE UR QL: NEGATIVE
NRBC # BLD AUTO: 0 10*3/UL
NRBC BLD AUTO-RTO: 0 /100
PH UR STRIP: 5 PH (ref 5–7)
PLATELET # BLD AUTO: 393 10E9/L (ref 150–450)
POTASSIUM SERPL-SCNC: 3.9 MMOL/L (ref 3.4–5.3)
PROT SERPL-MCNC: 7.8 G/DL (ref 6.8–8.8)
RBC # BLD AUTO: 4.9 10E12/L (ref 3.8–5.2)
RBC #/AREA URNS AUTO: 2 /HPF (ref 0–2)
SODIUM SERPL-SCNC: 141 MMOL/L (ref 133–144)
SOURCE: ABNORMAL
SP GR UR STRIP: 1.02 (ref 1–1.03)
SQUAMOUS #/AREA URNS AUTO: 2 /HPF (ref 0–1)
UROBILINOGEN UR STRIP-MCNC: NORMAL MG/DL (ref 0–2)
WBC # BLD AUTO: 9.7 10E9/L (ref 4–11)
WBC #/AREA URNS AUTO: 9 /HPF (ref 0–5)

## 2020-04-14 PROCEDURE — 80053 COMPREHEN METABOLIC PANEL: CPT | Performed by: EMERGENCY MEDICINE

## 2020-04-14 PROCEDURE — 83690 ASSAY OF LIPASE: CPT | Performed by: EMERGENCY MEDICINE

## 2020-04-14 PROCEDURE — 81001 URINALYSIS AUTO W/SCOPE: CPT | Performed by: EMERGENCY MEDICINE

## 2020-04-14 PROCEDURE — 96376 TX/PRO/DX INJ SAME DRUG ADON: CPT

## 2020-04-14 PROCEDURE — 87086 URINE CULTURE/COLONY COUNT: CPT | Performed by: EMERGENCY MEDICINE

## 2020-04-14 PROCEDURE — 25800030 ZZH RX IP 258 OP 636: Performed by: EMERGENCY MEDICINE

## 2020-04-14 PROCEDURE — 85025 COMPLETE CBC W/AUTO DIFF WBC: CPT | Performed by: EMERGENCY MEDICINE

## 2020-04-14 PROCEDURE — 74176 CT ABD & PELVIS W/O CONTRAST: CPT

## 2020-04-14 PROCEDURE — 96374 THER/PROPH/DIAG INJ IV PUSH: CPT

## 2020-04-14 PROCEDURE — 85379 FIBRIN DEGRADATION QUANT: CPT | Performed by: EMERGENCY MEDICINE

## 2020-04-14 PROCEDURE — 84703 CHORIONIC GONADOTROPIN ASSAY: CPT | Performed by: EMERGENCY MEDICINE

## 2020-04-14 PROCEDURE — 25000128 H RX IP 250 OP 636: Performed by: EMERGENCY MEDICINE

## 2020-04-14 PROCEDURE — 96375 TX/PRO/DX INJ NEW DRUG ADDON: CPT

## 2020-04-14 PROCEDURE — 99285 EMERGENCY DEPT VISIT HI MDM: CPT | Mod: 25

## 2020-04-14 PROCEDURE — 96361 HYDRATE IV INFUSION ADD-ON: CPT

## 2020-04-14 RX ORDER — NAPROXEN 500 MG/1
500 TABLET ORAL 2 TIMES DAILY WITH MEALS
Qty: 14 TABLET | Refills: 0 | Status: SHIPPED | OUTPATIENT
Start: 2020-04-14 | End: 2020-04-21

## 2020-04-14 RX ORDER — CYCLOBENZAPRINE HCL 10 MG
10 TABLET ORAL 3 TIMES DAILY PRN
Qty: 20 TABLET | Refills: 0 | Status: SHIPPED | OUTPATIENT
Start: 2020-04-14 | End: 2020-04-21

## 2020-04-14 RX ORDER — ONDANSETRON 2 MG/ML
4 INJECTION INTRAMUSCULAR; INTRAVENOUS ONCE
Status: COMPLETED | OUTPATIENT
Start: 2020-04-14 | End: 2020-04-14

## 2020-04-14 RX ORDER — KETOROLAC TROMETHAMINE 15 MG/ML
15 INJECTION, SOLUTION INTRAMUSCULAR; INTRAVENOUS ONCE
Status: COMPLETED | OUTPATIENT
Start: 2020-04-14 | End: 2020-04-14

## 2020-04-14 RX ORDER — LIDOCAINE 50 MG/G
1 PATCH TOPICAL EVERY 24 HOURS
Qty: 7 PATCH | Refills: 0 | Status: SHIPPED | OUTPATIENT
Start: 2020-04-14 | End: 2020-04-21

## 2020-04-14 RX ORDER — HYDROMORPHONE HYDROCHLORIDE 1 MG/ML
0.5 INJECTION, SOLUTION INTRAMUSCULAR; INTRAVENOUS; SUBCUTANEOUS
Status: DISCONTINUED | OUTPATIENT
Start: 2020-04-14 | End: 2020-04-14 | Stop reason: HOSPADM

## 2020-04-14 RX ADMIN — SODIUM CHLORIDE 1000 ML: 9 INJECTION, SOLUTION INTRAVENOUS at 16:47

## 2020-04-14 RX ADMIN — HYDROMORPHONE HYDROCHLORIDE 0.5 MG: 1 INJECTION, SOLUTION INTRAMUSCULAR; INTRAVENOUS; SUBCUTANEOUS at 17:46

## 2020-04-14 RX ADMIN — KETOROLAC TROMETHAMINE 15 MG: 15 INJECTION, SOLUTION INTRAMUSCULAR; INTRAVENOUS at 16:47

## 2020-04-14 RX ADMIN — HYDROMORPHONE HYDROCHLORIDE 0.5 MG: 1 INJECTION, SOLUTION INTRAMUSCULAR; INTRAVENOUS; SUBCUTANEOUS at 16:48

## 2020-04-14 RX ADMIN — ONDANSETRON 4 MG: 2 INJECTION INTRAMUSCULAR; INTRAVENOUS at 16:47

## 2020-04-14 ASSESSMENT — ENCOUNTER SYMPTOMS
DIARRHEA: 1
NAUSEA: 1
VOMITING: 0
COUGH: 0
SHORTNESS OF BREATH: 0
FLANK PAIN: 1
ABDOMINAL PAIN: 1
FREQUENCY: 0
FEVER: 0
HEMATURIA: 0
RHINORRHEA: 0
DYSURIA: 0

## 2020-04-14 NOTE — ED PROVIDER NOTES
History     Chief Complaint:  Flank Pain     HPI   Jenifer Delong is a 49 year old female who presents for evaluation of flank pain. This afternoon several hours prior to arrival the patient started to develop severe sharp right-sided flank pain with radiation into her abdomen and associated nausea without vomiting. This pain is worse with movement and palpation and has been waxing and waning in severity since onset. Due to this new severe pain, the patient came into the ED for evaluation. She did not take anything to manage her pain prior to arrival. She has no personal history of kidney stones and has never experienced similar pain. She does note that she drinks a lot of soda but has been trying to cut back on this recently. Otherwise, she did have one episode of diarrhea today and she did complete a course of Amoxicillin two days ago after having a tooth pulled, but she denies any fever, cough, rhinorrhea, congestion, shortness of breath, chest pain, dysuria, hematuria, or urinary frequency.     Allergies:  NKDA      Medications:    Atorvastatin  Wellbutrin  Carvedilol  EpiPen  Mirena  Lisinopril  Effexor XR     Past Medical History:    Cancer   Hypertension  Depression   Carpal tunnel syndrome     Past Surgical History:    Breast surgery    delivery   Laparoscopy diagnostic  Operative hysteroscopy     Family History:    Skin cancer - Father   Cerebrovascular disease - Father     Social History:  Tobacco use:    Never smoker   Alcohol use:    Negative   Drug use:    Negative   Marital status:       Accompanied to ED by:  Alone       Review of Systems   Constitutional: Negative for fever.   HENT: Negative for congestion and rhinorrhea.    Respiratory: Negative for cough and shortness of breath.    Cardiovascular: Negative for chest pain.   Gastrointestinal: Positive for abdominal pain, diarrhea and nausea. Negative for vomiting.   Genitourinary: Positive for flank pain (right). Negative for dysuria,  frequency and hematuria.   All other systems reviewed and are negative.      Physical Exam   First Vitals:  BP: (!) 174/114  Pulse: 96  Heart Rate: 120  Temp: 98.4  F (36.9  C)  Resp: 20  Weight: 89.8 kg (198 lb)  SpO2: 96 %      Physical Exam  Nursing note and vitals reviewed.  Constitutional: Cooperative.   HENT:   Mouth/Throat: Moist mucous membranes.   Eyes: EOMI, nonicteric sclera  Cardiovascular: Normal rate, regular rhythm, no murmurs, rubs, or gallops  Pulmonary/Chest: Effort normal and breath sounds normal. No respiratory distress. No wheezes. No rales.   Abdominal: Soft. Nontender, nondistended, no guarding or rigidity. +CVA tenderness.  Musculoskeletal: Normal range of motion. Right-sided lower back TTP.   Neurological: Alert. Moves all extremities spontaneously.   Skin: Skin is warm and dry. No rash noted.   Psychiatric: Normal mood and affect.     Emergency Department Course     Imaging:  Radiographic findings were communicated with the patient who voiced understanding of the findings.    Abd/Pelvis CT No Contrast - Stone Protocol:  IMPRESSION:   1.  No evidence for renal calculi or hydronephrosis.   2.  IUD.   3.  Left adrenal adenoma.   4.  No findings to account for the patient's right flank pain.   Imaging independently reviewed and agree with radiologist interpretation.      Laboratory:  CBC: WNL (WBC 9.7, HGB 13.4, ) \  CMP: Glucose 132 high, o/w WNL (Creatinine 0.76)   Lipase: 172   D dimer quantitative: 0.3   HCG Qualitative Serum: Negative   UA with Microscopic reflex to Culture: Trace blood, Small leukocyte esterase, WBC 9 high, Few bacteria, Squamous epithelial 2 high, Mucous present, o/w Negative   Urine Culture: Pending     Interventions:  1647 Toradol 15 mg IV   1647 NS 1,000 mL IV   1647 Zofran 4 mg IV   1648 Dilaudid 0.5 mg IV   1746 Dilaudid 0.5 mg IV     Emergency Department Course:  Nursing notes and vitals reviewed.  1638: I performed an exam of the patient as documented  above.     1818: I updated and reassessed the patient.     I personally reviewed the laboratory results with the patient and answered all related questions prior to discharge.     Findings and plan explained to the Patient. Patient discharged home with instructions regarding supportive care, medications, and reasons to return. The importance of close follow-up was reviewed. The patient was prescribed Flexeril, Naproxen, and Lidoderm patches.      Impression & Plan      Medical Decision Making:  Patient presents with right-sided flank pain.  Broad differential considered including musculoskeletal etiology, renal pathology, shingles, among others.  Labs unremarkable.  No signs of PE or UTI.  CT also negative for acute etiology.  On exam, patient's pain is below her kidneys, and is more suggestive of musculoskeletal low back pain.  Pain is worse with movement.  Patient works as a  and does a lot of lifting at her job.  Work note provided and she was recommended to rest, stretch, apply heat/ice, and Flexeril, naproxen, and Lidoderm was prescribed.  No signs of spinal cord impingement or other emergent etiology.  She is discharged in stable condition.  All questions answered.    Diagnosis:    ICD-10-CM   1. Acute right-sided low back pain without sciatica  M54.5     Disposition:  Discharged to home with Flexeril, Naproxen, and Lidoderm patches.     Discharge Medications:  New Prescriptions    CYCLOBENZAPRINE (FLEXERIL) 10 MG TABLET    Take 1 tablet (10 mg) by mouth 3 times daily as needed for muscle spasms    LIDOCAINE (LIDODERM) 5 % PATCH    Place 1 patch onto the skin every 24 hours for 7 days    NAPROXEN (NAPROSYN) 500 MG TABLET    Take 1 tablet (500 mg) by mouth 2 times daily (with meals) for 7 days       I, Prasanna Gardner, am serving as a scribe at 4:38 PM on 4/14/2020 to document services personally performed by Dr. Muniz, based on my observations and the provider's statements to me.    JOHANNY  [facility] EMERGENCY DEPARTMENT       Jorgito Muniz MD  04/15/20 0257

## 2020-04-14 NOTE — ED TRIAGE NOTES
Pt here with c/o R flank pain that radiates into RLQ that started a few hours ago. Nausea, no vomiting, diarrhea. No SOB, cough, fever. ABC intact.

## 2020-04-15 LAB
BACTERIA SPEC CULT: NO GROWTH
Lab: NORMAL
SPECIMEN SOURCE: NORMAL

## 2020-08-01 ENCOUNTER — HOSPITAL ENCOUNTER (EMERGENCY)
Facility: CLINIC | Age: 50
Discharge: HOME | End: 2020-08-01
Attending: EMERGENCY MEDICINE | Admitting: EMERGENCY MEDICINE

## 2020-08-01 ENCOUNTER — APPOINTMENT (OUTPATIENT)
Dept: GENERAL RADIOLOGY | Facility: CLINIC | Age: 50
End: 2020-08-01
Attending: EMERGENCY MEDICINE

## 2020-08-01 VITALS
HEART RATE: 75 BPM | DIASTOLIC BLOOD PRESSURE: 84 MMHG | SYSTOLIC BLOOD PRESSURE: 127 MMHG | RESPIRATION RATE: 20 BRPM | OXYGEN SATURATION: 96 % | TEMPERATURE: 98.9 F

## 2020-08-01 DIAGNOSIS — B34.9 NONSPECIFIC SYNDROME SUGGESTIVE OF VIRAL ILLNESS: ICD-10-CM

## 2020-08-01 DIAGNOSIS — Z20.822 PERSON UNDER INVESTIGATION FOR COVID-19: ICD-10-CM

## 2020-08-01 LAB
ANION GAP SERPL CALCULATED.3IONS-SCNC: 3 MMOL/L (ref 3–14)
BASOPHILS # BLD AUTO: 0 10E9/L (ref 0–0.2)
BASOPHILS NFR BLD AUTO: 0.3 %
BUN SERPL-MCNC: 17 MG/DL (ref 7–30)
CALCIUM SERPL-MCNC: 9 MG/DL (ref 8.5–10.1)
CHLORIDE SERPL-SCNC: 108 MMOL/L (ref 94–109)
CO2 SERPL-SCNC: 28 MMOL/L (ref 20–32)
CREAT SERPL-MCNC: 0.88 MG/DL (ref 0.52–1.04)
D DIMER PPP FEU-MCNC: 0.3 UG/ML FEU (ref 0–0.5)
DIFFERENTIAL METHOD BLD: ABNORMAL
EOSINOPHIL # BLD AUTO: 0.2 10E9/L (ref 0–0.7)
EOSINOPHIL NFR BLD AUTO: 2.5 %
ERYTHROCYTE [DISTWIDTH] IN BLOOD BY AUTOMATED COUNT: 12.5 % (ref 10–15)
GFR SERPL CREATININE-BSD FRML MDRD: 77 ML/MIN/{1.73_M2}
GLUCOSE SERPL-MCNC: 100 MG/DL (ref 70–99)
HCT VFR BLD AUTO: 43.4 % (ref 35–47)
HGB BLD-MCNC: 13.6 G/DL (ref 11.7–15.7)
IMM GRANULOCYTES # BLD: 0 10E9/L (ref 0–0.4)
IMM GRANULOCYTES NFR BLD: 0.5 %
LACTATE BLD-SCNC: 1.1 MMOL/L (ref 0.7–2)
LYMPHOCYTES # BLD AUTO: 2 10E9/L (ref 0.8–5.3)
LYMPHOCYTES NFR BLD AUTO: 30.6 %
MCH RBC QN AUTO: 27.8 PG (ref 26.5–33)
MCHC RBC AUTO-ENTMCNC: 31.3 G/DL (ref 31.5–36.5)
MCV RBC AUTO: 89 FL (ref 78–100)
MONOCYTES # BLD AUTO: 0.4 10E9/L (ref 0–1.3)
MONOCYTES NFR BLD AUTO: 6.8 %
NEUTROPHILS # BLD AUTO: 3.9 10E9/L (ref 1.6–8.3)
NEUTROPHILS NFR BLD AUTO: 59.3 %
NRBC # BLD AUTO: 0 10*3/UL
NRBC BLD AUTO-RTO: 0 /100
PLATELET # BLD AUTO: 371 10E9/L (ref 150–450)
POTASSIUM SERPL-SCNC: 4 MMOL/L (ref 3.4–5.3)
RBC # BLD AUTO: 4.89 10E12/L (ref 3.8–5.2)
SODIUM SERPL-SCNC: 139 MMOL/L (ref 133–144)
WBC # BLD AUTO: 6.5 10E9/L (ref 4–11)

## 2020-08-01 PROCEDURE — 96374 THER/PROPH/DIAG INJ IV PUSH: CPT

## 2020-08-01 PROCEDURE — 85025 COMPLETE CBC W/AUTO DIFF WBC: CPT | Performed by: EMERGENCY MEDICINE

## 2020-08-01 PROCEDURE — U0003 INFECTIOUS AGENT DETECTION BY NUCLEIC ACID (DNA OR RNA); SEVERE ACUTE RESPIRATORY SYNDROME CORONAVIRUS 2 (SARS-COV-2) (CORONAVIRUS DISEASE [COVID-19]), AMPLIFIED PROBE TECHNIQUE, MAKING USE OF HIGH THROUGHPUT TECHNOLOGIES AS DESCRIBED BY CMS-2020-01-R: HCPCS | Performed by: EMERGENCY MEDICINE

## 2020-08-01 PROCEDURE — 83605 ASSAY OF LACTIC ACID: CPT | Performed by: EMERGENCY MEDICINE

## 2020-08-01 PROCEDURE — 80048 BASIC METABOLIC PNL TOTAL CA: CPT | Performed by: EMERGENCY MEDICINE

## 2020-08-01 PROCEDURE — 25800030 ZZH RX IP 258 OP 636: Performed by: EMERGENCY MEDICINE

## 2020-08-01 PROCEDURE — 85379 FIBRIN DEGRADATION QUANT: CPT | Performed by: EMERGENCY MEDICINE

## 2020-08-01 PROCEDURE — 25000132 ZZH RX MED GY IP 250 OP 250 PS 637: Performed by: EMERGENCY MEDICINE

## 2020-08-01 PROCEDURE — 25000128 H RX IP 250 OP 636: Performed by: EMERGENCY MEDICINE

## 2020-08-01 PROCEDURE — 96361 HYDRATE IV INFUSION ADD-ON: CPT

## 2020-08-01 PROCEDURE — 71045 X-RAY EXAM CHEST 1 VIEW: CPT

## 2020-08-01 PROCEDURE — 99284 EMERGENCY DEPT VISIT MOD MDM: CPT | Mod: 25

## 2020-08-01 PROCEDURE — C9803 HOPD COVID-19 SPEC COLLECT: HCPCS

## 2020-08-01 RX ORDER — ALBUTEROL SULFATE 90 UG/1
6 AEROSOL, METERED RESPIRATORY (INHALATION) ONCE
Status: COMPLETED | OUTPATIENT
Start: 2020-08-01 | End: 2020-08-01

## 2020-08-01 RX ORDER — ALBUTEROL SULFATE 90 UG/1
2 AEROSOL, METERED RESPIRATORY (INHALATION) EVERY 6 HOURS PRN
Qty: 1 INHALER | Refills: 0 | Status: ON HOLD | OUTPATIENT
Start: 2020-08-01 | End: 2023-08-20

## 2020-08-01 RX ORDER — KETOROLAC TROMETHAMINE 15 MG/ML
15 INJECTION, SOLUTION INTRAMUSCULAR; INTRAVENOUS ONCE
Status: COMPLETED | OUTPATIENT
Start: 2020-08-01 | End: 2020-08-01

## 2020-08-01 RX ADMIN — ALBUTEROL SULFATE 6 PUFF: 90 AEROSOL, METERED RESPIRATORY (INHALATION) at 14:18

## 2020-08-01 RX ADMIN — SODIUM CHLORIDE 1000 ML: 9 INJECTION, SOLUTION INTRAVENOUS at 14:18

## 2020-08-01 RX ADMIN — KETOROLAC TROMETHAMINE 15 MG: 15 INJECTION, SOLUTION INTRAMUSCULAR; INTRAVENOUS at 14:18

## 2020-08-01 ASSESSMENT — ENCOUNTER SYMPTOMS
COUGH: 1
APPETITE CHANGE: 1
CHILLS: 1
RHINORRHEA: 1
MYALGIAS: 0
SORE THROAT: 1
SHORTNESS OF BREATH: 0

## 2020-08-01 NOTE — ED PROVIDER NOTES
History     Chief Complaint:  chills, cough    The history is provided by the patient.      Jenifer Delong is a 49 year old female who presents for evaluation of chills and an nonproductive cough. She has experienced these symptoms over the past 2 days in addition to headache, malaise, rhinorrhea, sore throat, and soft stools. She also reports anorexia and decreased sense of taste. She has not taken her temperature and is unsure if she had a fever. She describes non-pleuritic right inferior chest pain without shortness of breath. She has had no vomiting or myalgias. 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 She states that she wore a mask but relays that there were multiple positive tests during this time period. She traveled a couple hours by car to get there.    Allergies:  No known drug allergies    Medications:    Atorvastatin  Wellbutrin  Mirena IUD  Lisinopril  Effexor    Past Medical History:    Cancer  Depressive disorder  Hypertension  Chemical dependency  CTS    Past Surgical History:    Breast surgery     Operative hysteroscopy    Family History:    CAD  Skin cancer  Cerebrovascular disease    Social History:  Smoking status: never  Alcohol use: yes, rarely  Marital Status:    Works as a nurse.     Review of Systems   Constitutional: Positive for appetite change and chills. Negative for fever (unsure).   HENT: Positive for rhinorrhea and sore throat.    Respiratory: Positive for cough. Negative for shortness of breath.    Cardiovascular: Positive for chest pain.   Gastrointestinal: Positive for diarrhea (soft). Negative for vomiting.   Musculoskeletal: Negative for myalgias.   Neurological: Positive for headaches.   All other systems reviewed and are negative.    Physical Exam     Patient Vitals for the past 24 hrs:   BP Temp Temp src Pulse Resp SpO2   20 1515 127/84 -- -- 75 -- 96 %   20 1309 (!) 123/103 98.9  F (37.2  C) Temporal 100  20 100 %       Physical Exam  General: Well-developed and well-nourished. Fatigued appearing middle aged  woman. Cooperative.  Head:  Atraumatic.  Eyes:  Conjunctivae, lids, and sclerae are normal.  Neck:  Supple. Normal range of motion.  No nuchal rigidity.  CV:  Regular rate and rhythm. Normal heart sounds with no murmurs, rubs, or gallops detected.  Resp:  No respiratory distress. Clear to auscultation bilaterally without decreased breath sounds, wheezing, rales, or rhonchi.  GI:  Soft. Non-distended. Non-tender. No RUQ tenderness.    MS:  Normal ROM. No bilateral lower extremity edema.  Skin:  Warm. Non-diaphoretic. No pallor.  Neuro:  Awake. A&Ox3. Normal strength.  Psych: Normal mood and affect. Normal speech.  Vitals reviewed.    Emergency Department Course   Imaging:  XR Chest Port 1 View   Final Result   IMPRESSION:   1. Stable postoperative changes in the right chest as described.    2. No other evidence of acute cardiopulmonary disease is seen.      SHASHI CHEN MD        Laboratory:  Laboratory findings were communicated with the patient who voiced understanding of the findings.  CBC: WNL (WBC 6.5, HGB 13.6, )   BMP: Glucose 100 o/w WNL (Creatinine: 0.88)    D-dimer: 0.3    Lactic Acid: 1.1    Symptomatic COVID-19 Virus by PCR: in process    Interventions:  Medications   0.9% sodium chloride BOLUS (0 mLs Intravenous Stopped 8/1/20 1530)   ketorolac (TORADOL) injection 15 mg (15 mg Intravenous Given 8/1/20 1418)   albuterol (PROAIR HFA/PROVENTIL HFA/VENTOLIN HFA) 108 (90 Base) MCG/ACT inhaler 6 puff (6 puffs Inhalation Given 8/1/20 1418)       Emergency Department Course:  Past medical records, nursing notes, and vitals reviewed.  1330: I performed an exam of the patient and obtained history, as documented above.     Blood drawn. This was sent to the lab for further testing, results above.    1510: Patient updated and is comfortable with discharge.  She is feeling about the same but did  feel the albuterol helped.    Findings and plan explained to the patient. Patient discharged home with instructions regarding supportive care, medications, and reasons to return. The importance of close follow-up was reviewed.     Impression & Plan    Medical Decision Making:  Jenifer is a 49 year old woman presenting with chills, cough, headache, malaise, rhinorrhea, sore throat, anorexia, and right lower chest pain.  Fortunately, she appears quite well on exam and has no focal findings.  Her lungs are clear and she is not hypoxic.  She is no leukocytosis, anemia, electrolyte derangements, or lactic acidosis.  D-dimer is negative essentially ruling out pulmonary embolism.  Chest x-ray is similarly unremarkable without pneumonia, pneumothorax, or pleural effusion.  Despite her headache, she has no known fever, leukocytosis, or nuchal rigidity and meningitis is considered highly unlikely.  Certainly her symptoms are consistent with a viral syndrome and in the setting of the current COVID-19 pandemic, this is considered high on the differential.  This swab was sent and is pending.  However, there is no evidence of severe or overwhelming disease that would require admission at this time.  Jenifer was treated with Toradol, IV fluids, and albuterol.  On repeat check she states overall she is feeling about the same but did feel the albuterol helped somewhat.  As such, she will continue this as needed at home.  She understands she needs to isolate until her COVID-19 test has resulted with lots of rest, fluids, and Tylenol or ibuprofen as needed for pain or fever.  I encouraged her to follow-up with her primary care provider to ensure she is improving as expected but she does understand she should return should she have worsening symptoms or new concerns.  All questions answered.  Amenable to discharge.    Diagnosis:    ICD-10-CM    1. Nonspecific syndrome suggestive of viral illness  B34.9    2. Person under investigation for  COVID-19  Z20.828      Disposition:  discharged home    Discharge Medications:  Discharge Medication List as of 8/1/2020  3:30 PM      START taking these medications    Details   albuterol (PROAIR HFA/PROVENTIL HFA/VENTOLIN HFA) 108 (90 Base) MCG/ACT inhaler Inhale 2 puffs into the lungs every 6 hours as needed for shortness of breath / dyspnea or wheezing, Disp-1 Inhaler,R-0, Local Print           Scribe Disclosure:  I, María Campbell, am serving as a scribe at 1:30 PM on 8/1/2020 to document services personally performed by Margarita Carrillo MD based on my observations and the provider's statements to me.     María Campbell  8/1/2020   [facility] EMERGENCY DEPARTMENT       Margarita Carrillo MD  08/02/20 [id number]

## 2020-08-01 NOTE — DISCHARGE INSTRUCTIONS
COVID-19 pending. You will be called with positive results.  Lots of rest and fluids.  Tylenol or ibuprofen as needed for pain or fever.  Albuterol as needed for shortness of breath.  Return with severe worsening of symptoms or new concerns.  Otherwise, follow up with your primary care provider via phone or virtual visit this week to ensure you are approving as expected.    You need to isolate for 10 days from when symptoms started AND 72 hours after fever resolves (without fever reducing medications) AND improvement of respiratory symptoms (whichever is longer)  You and any members of your household should limit activities in public for 14 days after starting home isolation.   Follow CDC recommendations for isolation, household cleaning, etc.

## 2020-08-01 NOTE — LETTER
August 1, 2020      To Whom It May Concern:      Jenifer Delong was seen in our Emergency Department today, 08/01/20.  She will need to remain in isolation pending negative results or after 72 hours afebrile/after symptoms improve, whichever comes first. She may return to work when improved.    Sincerely,        Alesia Mcdowell RN

## 2020-08-01 NOTE — ED AVS SNAPSHOT
[facility]  [address]  [facility] [address]  Phone:  [phone]  Fax:  [phone]                                    Jenifer Delong   MRN: [id number]    Department:  [facility] Emergency Department   Date of Visit:  8/1/2020           After Visit Summary Signature Page    I have received my discharge instructions, and my questions have been answered. I have discussed any challenges I see with this plan with the nurse or doctor.    ..........................................................................................................................................  Patient/Patient Representative Signature      ..........................................................................................................................................  Patient Representative Print Name and Relationship to Patient    ..................................................               ................................................  Date                                   Time    ..........................................................................................................................................  Reviewed by Signature/Title    ...................................................              ..............................................  Date                                               Time          22EPIC Rev 08/18

## 2020-08-02 ASSESSMENT — ENCOUNTER SYMPTOMS
FEVER: 0
HEADACHES: 1
VOMITING: 0
DIARRHEA: 1

## 2020-08-03 LAB
SARS-COV-2 RNA SPEC QL NAA+PROBE: NOT DETECTED
SPECIMEN SOURCE: NORMAL

## 2020-11-29 ENCOUNTER — HEALTH MAINTENANCE LETTER (OUTPATIENT)
Age: 50
End: 2020-11-29

## 2020-12-26 ENCOUNTER — OFFICE VISIT (OUTPATIENT)
Dept: URGENT CARE | Facility: URGENT CARE | Age: 50
End: 2020-12-26
Payer: COMMERCIAL

## 2020-12-26 VITALS
TEMPERATURE: 99.3 F | OXYGEN SATURATION: 97 % | RESPIRATION RATE: 16 BRPM | DIASTOLIC BLOOD PRESSURE: 89 MMHG | HEART RATE: 108 BPM | SYSTOLIC BLOOD PRESSURE: 138 MMHG

## 2020-12-26 DIAGNOSIS — S05.01XA ABRASION OF RIGHT CORNEA, INITIAL ENCOUNTER: Primary | ICD-10-CM

## 2020-12-26 PROCEDURE — 99203 OFFICE O/P NEW LOW 30 MIN: CPT | Performed by: PHYSICIAN ASSISTANT

## 2020-12-26 RX ORDER — NEOMYCIN POLYMYXIN B SULFATES AND DEXAMETHASONE 3.5; 10000; 1 MG/ML; [USP'U]/ML; MG/ML
1 SUSPENSION/ DROPS OPHTHALMIC 4 TIMES DAILY
Qty: 5 ML | Refills: 0 | Status: SHIPPED | OUTPATIENT
Start: 2020-12-26 | End: 2021-01-02

## 2020-12-26 ASSESSMENT — ENCOUNTER SYMPTOMS
FEVER: 0
EYE REDNESS: 1
EYE PAIN: 1
EYE DISCHARGE: 0
PHOTOPHOBIA: 1

## 2020-12-26 NOTE — PATIENT INSTRUCTIONS
Patient Education     Corneal Injury    An eye injury can hurt your cornea. Your cornea is the clear layer on the front of your eye. It protects your eye from dust and germs, and helps filter out harmful UV (ultraviolet) rays. The cornea also helps to focus light entering your eye. Your cornea is made of strong proteins, but it can be damaged. A slight cut or scratch (abrasion) to the cornea can be very painful. But that is often minor and can heal within 1 or 2 days. A bad abrasion or a hole (puncture) in the cornea can be very serious. These are medical emergencies.  Something in your eye  If you think you have something small in your eye, flush it with water right away. Pull your upper lid out and over your bottom lid. This will help increase the flow of tears across your eye. If these methods don t work, call your healthcare provider. Never try to remove an object from your eye that doesn t flush out easily with water. Doing so may cause more damage.  When to go to the emergency room (ER)  Call 911 or your local emergency number if you have:    Severe eye pain    A puncture injury or bad abrasion    Something in your eye that you can t flush out with water    A very swollen or painful eye after removing an object    A chemical burn    An object embedded in your eye. Cover both eyes with a sterile compress and keep both eyes closed while you wait for help. Don't put any pressure on your eyes.  What to expect in the ER  For minor abrasions   Minor abrasions are usually treated with eye drops or ointment. You may be given antibiotics to prevent infection. Most abrasions heal in 1 or 2 days. To help rule out more serious injuries, you may have tests including:    A standard eye exam to check how well you can see    A Nas test, which uses a special dye to look for a hole in the surface of your eye  Depending on the results of these tests, you may be referred to an eye specialist (ophthalmologist).  For serious  abrasions or punctures  You will be referred directly to an ophthalmologist for emergency treatment. An eye specialist is needed to reduce further damage and possible vision loss.  [employer] last reviewed this educational content on 10/1/2017    0676-0702 The [employer]. [address] [place] [address], [place] [address]. All rights reserved. This information is not intended as a substitute for professional medical care. Always follow your healthcare professional's instructions.           Patient Education     Corneal Abrasion    You have received a scratch or scrape (abrasion) to your cornea. The cornea is the clear part in the front of the eye. This sensitive area is very painful when injured. You may make tears frequently, and your vision may be blurry until the injury heals. You may be sensitive to light.  This part of the body heals quickly. You can expect the pain to go away within 24 to 48 hours. If the abrasion is large or deep, your doctor may apply an eye patch, although this is not always done. An antibiotic ointment or eye drops may also be used to prevent infection.  Numbing drops may be used to relieve the pain temporarily so that your eyes can be examined. But these drops can t be prescribed for home use because that would prevent healing and lead to more serious problems. Also, if you can t feel your eye, there is a chance of accidentally injuring it further without knowing it.  Home care    A cold pack may be applied over the eye (or eye patch) for 20 minutes at a time, to reduce pain. To make a cold pack, put ice cubes in a plastic bag that seals at the top. Wrap the bag in a clean, thin towel or cloth.    You may use acetaminophen or ibuprofen to control pain, unless another pain medicine was prescribed. Note: If you have chronic liver or kidney disease or have ever had a stomach ulcer or GI bleeding, talk with your doctor before using these medicines.    Rest your eyes and don t read until  symptoms are gone.    If you use contact lenses, don t wear them until all symptoms are gone.    If your vision is affected by the corneal abrasion or if an eye patch was applied, don t drive a motor vehicle or operate machinery until all symptoms are gone. You may have trouble judging distances using only one eye.    If your eyes are sensitive to light, try wearing sunglasses, or stay indoors until symptoms go away.    Follow-up care  Follow up with your healthcare provider, or as advised.    If no patch was put on your eye and the pain continues for more than 48 hours, you should have another exam. Contact your healthcare provider to arrange this.    If your eye was patched and you were asked to remove the patch yourself, see your healthcare provider. Contact your healthcare provider if you still have pain after the patch is removed.    If you were given a return appointment for patch removal and re-examination, be sure to keep the appointment. Leaving the patch in place longer than advised could be harmful.  When to seek medical advice  Call your healthcare provider right away if any of these occur.    Increasing eye pain or pain that does not improve after 24 hours    Discharge from the eye    Increasing redness of the eye or swelling of the eyelids    Worsening vision    Symptoms get worse after the abrasion has healed  [employer] last reviewed this educational content on 7/1/2017 2000-2020 [employer]. [address] [place] [address], [place] [address]. All rights reserved. This information is not intended as a substitute for professional medical care. Always follow your healthcare professional's instructions.

## 2020-12-26 NOTE — PROGRESS NOTES
SUBJECTIVE:   Jenifer Delong is a 50 year old female presenting with a chief complaint of   Chief Complaint   Patient presents with     Urgent Care     Eye Problem     Sharp end of dog leash went in Right eye-DOI: 20-Eye is watering, burning and consistant pain       She is a new patient of [facility].    Eye Problem    Onset of symptoms was 2 hour(s) ago.   Location: right eye   Course of illness is same.    Severity moderately severe  Current and Associated symptoms: pain, redness, increase lacrimation, photosensitivity.  Treatment measures tried include none  Context: Eye injury -- she inadvertently was hit in the right eye with the metal part of her dog's collar.  She jarvis not wear contacts lenses.      Review of Systems   Constitutional: Negative for fever.   Eyes: Positive for photophobia, pain and redness. Negative for discharge.       Past Medical History:   Diagnosis Date     Abnormal Pap smear of cervix     normal colposcopy. normal paps since     Cancer (H)      Depressive disorder      Hypertension      Family History   Problem Relation Age of Onset     C.A.D. Paternal Grandmother          from MI at age of 66yo     Cancer Father         skin cancer excisions     Cerebrovascular Disease Father      Cancer - colorectal Paternal Grandfather      C.A.D. Paternal Uncle         s/p CABG x 4     Breast Cancer Paternal Aunt      Current Outpatient Medications   Medication Sig Dispense Refill     BuPROPion HCl (WELLBUTRIN PO)        EPINEPHrine (EPIPEN 2-ADIS) 0.3 MG/0.3ML injection Inject 0.3 mLs (0.3 mg) into the muscle once as needed for anaphylaxis (For sudden onset of difficulty breathing and or mouth/throat swelling) 1 each 0     levonorgestrel (MIRENA) 20 MCG/24HR IUD 1 each by Intrauterine route once       LISINOPRIL PO Take 10 mg by mouth       neomycin-polymixin-dexamethasone (MAXITROL) 0.1 % ophthalmic suspension Place 1 drop into the right eye 4 times daily for 7 days 5 mL 0      venlafaxine (EFFEXOR-XR) 150 MG 24 hr capsule        albuterol (PROAIR HFA/PROVENTIL HFA/VENTOLIN HFA) 108 (90 Base) MCG/ACT inhaler Inhale 2 puffs into the lungs every 6 hours as needed for shortness of breath / dyspnea or wheezing (Patient not taking: Reported on 12/26/2020) 1 Inhaler 0     ATORVASTATIN CALCIUM PO        CARVEDILOL PO        Social History     Tobacco Use     Smoking status: Never Smoker     Smokeless tobacco: Never Used   Substance Use Topics     Alcohol use: No     Comment: rare       OBJECTIVE  /89   Pulse 108   Temp 99.3  F (37.4  C) (Tympanic)   Resp 16   SpO2 97%   Breastfeeding No     Physical Exam  HENT:      Head: Normocephalic.   Eyes:      General:         Right eye: No discharge.         Left eye: No discharge.      Extraocular Movements: Extraocular movements intact.      Pupils: Pupils are equal, round, and reactive to light.      Comments: Right eye conjunctiva is mildly injected. Eye is watery. Eye is numbed with tetracaine. Right eye stained with fluorescin. Under wood lamp fluorescence noted at 3' oclock. No foreign body noted. Lids are normal.    Neurological:      Mental Status: She is alert.         Labs:  No results found for this or any previous visit (from the past 24 hour(s)).        ASSESSMENT:      ICD-10-CM    1. Abrasion of right cornea, initial encounter  S05.01XA neomycin-polymixin-dexamethasone (MAXITROL) 0.1 % ophthalmic suspension            PLAN:    Right eye corneal abrasion: Maxitrol eye drops Rx. Patient educational information provided. Would anticipate gradual improvement. Follow up if any worsening symptoms. Patient agrees.     Followup:    If not improving or if condition worsens, follow up with your Primary Care Provider    Patient Instructions     Patient Education     Corneal Injury    An eye injury can hurt your cornea. Your cornea is the clear layer on the front of your eye. It protects your eye from dust and germs, and helps filter out  harmful UV (ultraviolet) rays. The cornea also helps to focus light entering your eye. Your cornea is made of strong proteins, but it can be damaged. A slight cut or scratch (abrasion) to the cornea can be very painful. But that is often minor and can heal within 1 or 2 days. A bad abrasion or a hole (puncture) in the cornea can be very serious. These are medical emergencies.  Something in your eye  If you think you have something small in your eye, flush it with water right away. Pull your upper lid out and over your bottom lid. This will help increase the flow of tears across your eye. If these methods don t work, call your healthcare provider. Never try to remove an object from your eye that doesn t flush out easily with water. Doing so may cause more damage.  When to go to the emergency room (ER)  Call 911 or your local emergency number if you have:    Severe eye pain    A puncture injury or bad abrasion    Something in your eye that you can t flush out with water    A very swollen or painful eye after removing an object    A chemical burn    An object embedded in your eye. Cover both eyes with a sterile compress and keep both eyes closed while you wait for help. Don't put any pressure on your eyes.  What to expect in the ER  For minor abrasions   Minor abrasions are usually treated with eye drops or ointment. You may be given antibiotics to prevent infection. Most abrasions heal in 1 or 2 days. To help rule out more serious injuries, you may have tests including:    A standard eye exam to check how well you can see    A Nas test, which uses a special dye to look for a hole in the surface of your eye  Depending on the results of these tests, you may be referred to an eye specialist (ophthalmologist).  For serious abrasions or punctures  You will be referred directly to an ophthalmologist for emergency treatment. An eye specialist is needed to reduce further damage and possible vision loss.  [employer] last  reviewed this educational content on 10/1/2017    1535-7960 [employer]. [address] [place] [address], [place] [address]. All rights reserved. This information is not intended as a substitute for professional medical care. Always follow your healthcare professional's instructions.           Patient Education     Corneal Abrasion    You have received a scratch or scrape (abrasion) to your cornea. The cornea is the clear part in the front of the eye. This sensitive area is very painful when injured. You may make tears frequently, and your vision may be blurry until the injury heals. You may be sensitive to light.  This part of the body heals quickly. You can expect the pain to go away within 24 to 48 hours. If the abrasion is large or deep, your doctor may apply an eye patch, although this is not always done. An antibiotic ointment or eye drops may also be used to prevent infection.  Numbing drops may be used to relieve the pain temporarily so that your eyes can be examined. But these drops can t be prescribed for home use because that would prevent healing and lead to more serious problems. Also, if you can t feel your eye, there is a chance of accidentally injuring it further without knowing it.  Home care    A cold pack may be applied over the eye (or eye patch) for 20 minutes at a time, to reduce pain. To make a cold pack, put ice cubes in a plastic bag that seals at the top. Wrap the bag in a clean, thin towel or cloth.    You may use acetaminophen or ibuprofen to control pain, unless another pain medicine was prescribed. Note: If you have chronic liver or kidney disease or have ever had a stomach ulcer or GI bleeding, talk with your doctor before using these medicines.    Rest your eyes and don t read until symptoms are gone.    If you use contact lenses, don t wear them until all symptoms are gone.    If your vision is affected by the corneal abrasion or if an eye patch was applied, don t drive a motor  vehicle or operate machinery until all symptoms are gone. You may have trouble judging distances using only one eye.    If your eyes are sensitive to light, try wearing sunglasses, or stay indoors until symptoms go away.    Follow-up care  Follow up with your healthcare provider, or as advised.    If no patch was put on your eye and the pain continues for more than 48 hours, you should have another exam. Contact your healthcare provider to arrange this.    If your eye was patched and you were asked to remove the patch yourself, see your healthcare provider. Contact your healthcare provider if you still have pain after the patch is removed.    If you were given a return appointment for patch removal and re-examination, be sure to keep the appointment. Leaving the patch in place longer than advised could be harmful.  When to seek medical advice  Call your healthcare provider right away if any of these occur.    Increasing eye pain or pain that does not improve after 24 hours    Discharge from the eye    Increasing redness of the eye or swelling of the eyelids    Worsening vision    Symptoms get worse after the abrasion has healed  [employer] last reviewed this educational content on 7/1/2017 2000-2020 [employer]. [address] [place] [address]. All rights reserved. This information is not intended as a substitute for professional medical care. Always follow your healthcare professional's instructions.

## 2021-02-12 ENCOUNTER — HOSPITAL ENCOUNTER (EMERGENCY)
Facility: CLINIC | Age: 51
Discharge: HOME | End: 2021-02-13
Attending: EMERGENCY MEDICINE | Admitting: EMERGENCY MEDICINE
Payer: COMMERCIAL

## 2021-02-12 VITALS
OXYGEN SATURATION: 99 % | DIASTOLIC BLOOD PRESSURE: 88 MMHG | SYSTOLIC BLOOD PRESSURE: 137 MMHG | RESPIRATION RATE: 20 BRPM | TEMPERATURE: 98.3 F | HEART RATE: 85 BPM

## 2021-02-12 DIAGNOSIS — H60.393 INFECTIVE OTITIS EXTERNA, BILATERAL: ICD-10-CM

## 2021-02-12 PROCEDURE — 99282 EMERGENCY DEPT VISIT SF MDM: CPT

## 2021-02-12 RX ORDER — OFLOXACIN 3 MG/ML
5 SOLUTION AURICULAR (OTIC) 2 TIMES DAILY
Qty: 4 ML | Refills: 0 | Status: SHIPPED | OUTPATIENT
Start: 2021-02-12 | End: 2021-02-19

## 2021-02-13 ASSESSMENT — ENCOUNTER SYMPTOMS
NECK STIFFNESS: 1
FEVER: 0

## 2021-02-13 NOTE — ED TRIAGE NOTES
"Pt states bilateral otalgia for one day pta worse tonight. Pt also notes neck pain, pt is able to touch chin to chest in triage. Pt c/o \"extreme\" tenderness with palpation of mastoid area in triage. ABCs intact GCS 15  "

## 2021-02-13 NOTE — ED NOTES
Pt's rx sent down to pharmacy to be filled w/ copy of pt's insurance card per pt request. Wallace in pharmacy was informed of rx being sent down. Pt will wait in room for med.

## 2021-02-13 NOTE — ED PROVIDER NOTES
History     Chief Complaint:  Otalgia     HPI  Jenifer Delong is a 50 year old female with a history of hypertension who presents for evaluation of bilateral otalgia.  The patient states that her ear pain began yesterday and has gradually gotten worse, progressing into a stiff neck and jaw.  She states that the pain is the worst it has been tonight and describes it as a sharp, poking pain.  She feels as though she has drainage, but knows there is none.  She denies a fever.  Of note, she has been swimming recently at [employer].    Allergies:  No Known Drug Allergies    Medications:   Albuterol  Atorvastatin  Wellbutrin  Carvedilol  Mirena  Lisinopril  Effexor     Medical History:   [place] cancer  Depressive disorder  Hypertension  Carpal tunnel syndrome    Surgical History   C section  Mastectomy  Hysterectomy    Family History:   Father: cancer, cerebrovascular disease    Social History:  Patient presents to ED alone.  PCP: Debbi Quintero    Review of Systems   Constitutional: Negative for fever.   HENT: Positive for ear pain. Negative for ear discharge.    Musculoskeletal: Positive for neck stiffness.   All other systems reviewed and are negative.    Physical Exam     Patient Vitals for the past 24 hrs:   BP Temp Temp src Pulse Resp SpO2   02/12/21 2334 137/88 98.3  F (36.8  C) Oral 85 20 99 %      Physical Exam   Nursing noedemitiste and vitals reviewed.  Constitutional: Cooperative.   HENT:   Mouth/Throat: Mucous membranes are normal. Post oropharyngeal normal.  Ears:  Edematous external auditory canal bilaterally, left more prominent.  Right TM normal.  Unable to visualize left TM.  No erythema or swelling over mastoid prominences.   Pulmonary/Chest: Effort normal.   Neurological: Alert. Oriented x4  Skin: Skin is warm and dry  Psychiatric: Normal mood and affect.     Emergency Department Course     Reviewed:  I reviewed the patient's nursing notes, vitals, past medical records, Care Everywhere.       Assessments:  2342 I preformed my initial assessment of the patient.     Disposition:  Discharged to home.     Impression & Plan     Medical Decision Making:  Jenifer Delong presents for evaluation of bilateral ear pain.  The patient has an exam consistent with otitis externa.  No evidence for mastoiditis or meningitis. Antibiotic drops w/steroids were prescribed.  Discussed OTC pain medication.  Return if increasing pain, fever, decrease in hearing or ear discharge.  Follow-up with primary physician if symptoms persist.      Diagnosis:     ICD-10-CM    1. Infective otitis externa, bilateral  H60.393         Disposition:  Discharged to home.    Discharge Medications:  Discharge Medication List as of 2/12/2021 11:57 PM      START taking these medications    Details   ofloxacin (FLOXIN) 0.3 % otic solution Place 5 drops into both ears 2 times daily for 7 days, Disp-4 mL, R-0, Local Print             Scribe Disclosure:  I, Lianet Rodriguez, am serving as a scribe at 11:56 PM on 2/12/2021 to document services personally performed by Brandon Cavanaugh MD based on my observations and the provider's statements to me.     Brandon Hilliard RD, MD  02/13/21 0053

## 2021-04-10 ENCOUNTER — HEALTH MAINTENANCE LETTER (OUTPATIENT)
Age: 51
End: 2021-04-10

## 2021-05-12 ENCOUNTER — HOSPITAL ENCOUNTER (EMERGENCY)
Facility: CLINIC | Age: 51
Discharge: HOME | End: 2021-05-13
Attending: EMERGENCY MEDICINE | Admitting: EMERGENCY MEDICINE
Payer: COMMERCIAL
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
## 2021-05-12 DIAGNOSIS — S30.1XXA CONTUSION OF ABDOMINAL WALL, INITIAL ENCOUNTER: ICD-10-CM

## 2021-05-12 DIAGNOSIS — S20.219A CONTUSION OF CHEST WALL, UNSPECIFIED LATERALITY, INITIAL ENCOUNTER: ICD-10-CM

## 2021-05-12 PROCEDURE — 99285 EMERGENCY DEPT VISIT HI MDM: CPT | Mod: 25

## 2021-05-12 RX ORDER — HYDROMORPHONE HYDROCHLORIDE 1 MG/ML
0.5 INJECTION, SOLUTION INTRAMUSCULAR; INTRAVENOUS; SUBCUTANEOUS
Status: DISCONTINUED | OUTPATIENT
Start: 2021-05-12 | End: 2021-05-13 | Stop reason: HOSPADM

## 2021-05-12 RX ORDER — ONDANSETRON 2 MG/ML
4 INJECTION INTRAMUSCULAR; INTRAVENOUS EVERY 30 MIN PRN
Status: DISCONTINUED | OUTPATIENT
Start: 2021-05-12 | End: 2021-05-13 | Stop reason: HOSPADM

## 2021-05-12 ASSESSMENT — MIFFLIN-ST. JEOR: SCORE: 1627.86

## 2021-05-12 NOTE — LETTER
May 13, 2021      To Whom It May Concern:      Jenifer Delong was seen in our Emergency Department today, 05/13/21.  I expect her condition to improve over the next 2-3 days.  She may return to work/school when improved.    Sincerely,            Vianca GARDNER RN

## 2021-05-13 ENCOUNTER — APPOINTMENT (OUTPATIENT)
Dept: CT IMAGING | Facility: CLINIC | Age: 51
End: 2021-05-13
Attending: EMERGENCY MEDICINE
Payer: COMMERCIAL

## 2021-05-13 VITALS
BODY MASS INDEX: 33.74 KG/M2 | WEIGHT: 215 LBS | TEMPERATURE: 98.1 F | DIASTOLIC BLOOD PRESSURE: 91 MMHG | SYSTOLIC BLOOD PRESSURE: 142 MMHG | HEIGHT: 67 IN | RESPIRATION RATE: 18 BRPM | OXYGEN SATURATION: 99 % | HEART RATE: 82 BPM

## 2021-05-13 LAB
ABO + RH BLD: NORMAL
ABO + RH BLD: NORMAL
ALBUMIN SERPL-MCNC: 4 G/DL (ref 3.4–5)
ALP SERPL-CCNC: 70 U/L (ref 40–150)
ALT SERPL W P-5'-P-CCNC: 28 U/L (ref 0–50)
ANION GAP SERPL CALCULATED.3IONS-SCNC: 3 MMOL/L (ref 3–14)
AST SERPL W P-5'-P-CCNC: 25 U/L (ref 0–45)
BASOPHILS # BLD AUTO: 0 10E9/L (ref 0–0.2)
BASOPHILS NFR BLD AUTO: 0.4 %
BILIRUB SERPL-MCNC: 0.2 MG/DL (ref 0.2–1.3)
BLD GP AB SCN SERPL QL: NORMAL
BLOOD BANK CMNT PATIENT-IMP: NORMAL
BUN SERPL-MCNC: 19 MG/DL (ref 7–30)
CALCIUM SERPL-MCNC: 8.9 MG/DL (ref 8.5–10.1)
CHLORIDE SERPL-SCNC: 109 MMOL/L (ref 94–109)
CO2 SERPL-SCNC: 27 MMOL/L (ref 20–32)
CREAT SERPL-MCNC: 0.85 MG/DL (ref 0.52–1.04)
DIFFERENTIAL METHOD BLD: NORMAL
EOSINOPHIL # BLD AUTO: 0.3 10E9/L (ref 0–0.7)
EOSINOPHIL NFR BLD AUTO: 3.6 %
ERYTHROCYTE [DISTWIDTH] IN BLOOD BY AUTOMATED COUNT: 13 % (ref 10–15)
GFR SERPL CREATININE-BSD FRML MDRD: 80 ML/MIN/{1.73_M2}
GLUCOSE SERPL-MCNC: 108 MG/DL (ref 70–99)
HCT VFR BLD AUTO: 37.7 % (ref 35–47)
HGB BLD-MCNC: 12.2 G/DL (ref 11.7–15.7)
IMM GRANULOCYTES # BLD: 0 10E9/L (ref 0–0.4)
IMM GRANULOCYTES NFR BLD: 0.3 %
INR PPP: 0.98 (ref 0.86–1.14)
LYMPHOCYTES # BLD AUTO: 2 10E9/L (ref 0.8–5.3)
LYMPHOCYTES NFR BLD AUTO: 25.6 %
MCH RBC QN AUTO: 28.1 PG (ref 26.5–33)
MCHC RBC AUTO-ENTMCNC: 32.4 G/DL (ref 31.5–36.5)
MCV RBC AUTO: 87 FL (ref 78–100)
MONOCYTES # BLD AUTO: 0.6 10E9/L (ref 0–1.3)
MONOCYTES NFR BLD AUTO: 7.8 %
NEUTROPHILS # BLD AUTO: 5 10E9/L (ref 1.6–8.3)
NEUTROPHILS NFR BLD AUTO: 62.3 %
NRBC # BLD AUTO: 0 10*3/UL
NRBC BLD AUTO-RTO: 0 /100
PLATELET # BLD AUTO: 338 10E9/L (ref 150–450)
POTASSIUM SERPL-SCNC: 3.9 MMOL/L (ref 3.4–5.3)
PROT SERPL-MCNC: 7.4 G/DL (ref 6.8–8.8)
RBC # BLD AUTO: 4.34 10E12/L (ref 3.8–5.2)
SODIUM SERPL-SCNC: 139 MMOL/L (ref 133–144)
SPECIMEN EXP DATE BLD: NORMAL
WBC # BLD AUTO: 8 10E9/L (ref 4–11)

## 2021-05-13 PROCEDURE — 250N000011 HC RX IP 250 OP 636: Performed by: EMERGENCY MEDICINE

## 2021-05-13 PROCEDURE — 96375 TX/PRO/DX INJ NEW DRUG ADDON: CPT

## 2021-05-13 PROCEDURE — 85025 COMPLETE CBC W/AUTO DIFF WBC: CPT | Performed by: EMERGENCY MEDICINE

## 2021-05-13 PROCEDURE — 71260 CT THORAX DX C+: CPT

## 2021-05-13 PROCEDURE — 85610 PROTHROMBIN TIME: CPT | Performed by: EMERGENCY MEDICINE

## 2021-05-13 PROCEDURE — 80053 COMPREHEN METABOLIC PANEL: CPT | Performed by: EMERGENCY MEDICINE

## 2021-05-13 PROCEDURE — 96374 THER/PROPH/DIAG INJ IV PUSH: CPT

## 2021-05-13 PROCEDURE — 86850 RBC ANTIBODY SCREEN: CPT | Performed by: EMERGENCY MEDICINE

## 2021-05-13 PROCEDURE — 86901 BLOOD TYPING SEROLOGIC RH(D): CPT | Performed by: EMERGENCY MEDICINE

## 2021-05-13 PROCEDURE — 86900 BLOOD TYPING SEROLOGIC ABO: CPT | Performed by: EMERGENCY MEDICINE

## 2021-05-13 RX ORDER — IBUPROFEN 600 MG/1
600 TABLET, FILM COATED ORAL EVERY 6 HOURS PRN
Qty: 30 TABLET | Refills: 0 | Status: SHIPPED | OUTPATIENT
Start: 2021-05-13 | End: 2021-06-12

## 2021-05-13 RX ORDER — KETOROLAC TROMETHAMINE 15 MG/ML
15 INJECTION, SOLUTION INTRAMUSCULAR; INTRAVENOUS ONCE
Status: COMPLETED | OUTPATIENT
Start: 2021-05-13 | End: 2021-05-13

## 2021-05-13 RX ORDER — IOPAMIDOL 755 MG/ML
500 INJECTION, SOLUTION INTRAVASCULAR ONCE
Status: COMPLETED | OUTPATIENT
Start: 2021-05-13 | End: 2021-05-13

## 2021-05-13 RX ORDER — CYCLOBENZAPRINE HCL 10 MG
5-10 TABLET ORAL 3 TIMES DAILY PRN
Qty: 15 TABLET | Refills: 0 | Status: ON HOLD | OUTPATIENT
Start: 2021-05-13 | End: 2023-08-20

## 2021-05-13 RX ADMIN — HYDROMORPHONE HYDROCHLORIDE 0.5 MG: 1 INJECTION, SOLUTION INTRAMUSCULAR; INTRAVENOUS; SUBCUTANEOUS at 01:07

## 2021-05-13 RX ADMIN — IOPAMIDOL 100 ML: 755 INJECTION, SOLUTION INTRAVENOUS at 00:27

## 2021-05-13 RX ADMIN — HYDROMORPHONE HYDROCHLORIDE 0.5 MG: 1 INJECTION, SOLUTION INTRAMUSCULAR; INTRAVENOUS; SUBCUTANEOUS at 00:02

## 2021-05-13 RX ADMIN — KETOROLAC TROMETHAMINE 15 MG: 15 INJECTION, SOLUTION INTRAMUSCULAR; INTRAVENOUS at 01:14

## 2021-05-13 ASSESSMENT — ENCOUNTER SYMPTOMS
NECK PAIN: 0
ABDOMINAL PAIN: 1
BACK PAIN: 1
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
## 2021-06-12 ENCOUNTER — NURSE TRIAGE (OUTPATIENT)
Dept: NURSING | Facility: CLINIC | Age: 51
End: 2021-06-12

## 2021-06-12 ENCOUNTER — OFFICE VISIT (OUTPATIENT)
Dept: URGENT CARE | Facility: URGENT CARE | Age: 51
End: 2021-06-12
Payer: COMMERCIAL

## 2021-06-12 VITALS
HEART RATE: 85 BPM | DIASTOLIC BLOOD PRESSURE: 93 MMHG | TEMPERATURE: 99.7 F | RESPIRATION RATE: 16 BRPM | SYSTOLIC BLOOD PRESSURE: 146 MMHG | OXYGEN SATURATION: 99 %

## 2021-06-12 DIAGNOSIS — L02.421 FURUNCLE OF RIGHT AXILLA: Primary | ICD-10-CM

## 2021-06-12 PROCEDURE — 99213 OFFICE O/P EST LOW 20 MIN: CPT | Performed by: FAMILY MEDICINE

## 2021-06-12 RX ORDER — CEPHALEXIN 500 MG/1
500 CAPSULE ORAL 3 TIMES DAILY
Qty: 30 CAPSULE | Refills: 0 | Status: SHIPPED | OUTPATIENT
Start: 2021-06-12 | End: 2021-06-22

## 2021-06-12 NOTE — TELEPHONE ENCOUNTER
Pt has a history of right breast cancer.    Pt feels a lump under her right axilla x 2 days. At first, it was pea-sized. Now it feels dime-sized.   The site is tender to the touch, and reddish/pink.  Pt is afebrile, and denies any drainage from the site.  She rates her pain at 5 to 7/10. It is a constant throbbing and ache, that worsens with any type of movement  Pt does not feel unwell, and denies any swelling of lymph nodes.  Pt is very concerned about the lump due to her prior history of breast cancer.  Per protocol, pt advised to be seen by a provider within 24 hours.  Damari Means RN 06/12/21 1:38 PM  [facility] Nurse Advisor      Reason for Disposition    Looks like a boil, infected sore, deep ulcer or other infected rash    Additional Information    Negative: Sounds like a life-threatening emergency to the triager    Negative: Small growth, spot, bump, or pigmented area of skin (e.g., moles, skin tags, wart, melanoma, skin cancer)    Negative: Inguinal hernia previously diagnosed by a physician    Negative: Followed a skin injury    Negative: Follows an insect bite    Negative: Swelling of lymph node suspected    Negative: Swelling of vaccination site    Negative: Swelling of tongue    Negative: Swelling of lip    Negative: Swelling of eye    Negative: Swelling of entire face    Negative: Swelling of scrotum    Negative: Swelling of labia    Negative: Swelling of surgical incision    Negative: Swelling of ankle joint    Negative: Swelling of elbow joint    Negative: Swelling of knee joint    Negative: Swelling with a skin rash    Negative: Patient sounds very sick or weak to the triager    Negative: SEVERE pain (e.g., excruciating)    Negative: [1] Swelling is painful to touch AND [2] fever    Negative: [1] Swelling is red AND [2] fever    Negative: [1] Swelling is red AND [2] size > 2 inches (5.0 cm) (Exception: itchy area of skin)    Negative: [1] Swelling of groin (inguinal area) AND [2]  painful    Negative: [1] Swelling is painful to touch AND [2] no fever    Protocols used: SKIN LUMP OR LOCALIZED SWELLING-A-AH    COVID 19 Nurse Triage Plan/Patient Instructions    Please be aware that novel coronavirus (COVID-19) may be circulating in the community. If you develop symptoms such as fever, cough, or SOB or if you have concerns about the presence of another infection including coronavirus (COVID-19), please contact your health care provider or visit [email or web address].     Disposition/Instructions    In-Person Visit with provider recommended. Reference Visit Selection Guide.    Thank you for taking steps to prevent the spread of this virus.  o Limit your contact with others.  o Wear a simple mask to cover your cough.  o Wash your hands well and often.    Resources    [facility]: About COVID-19: [email or web address]    CDC: What to Do If You're Sick: www.cdc.gov/coronavirus/2019-ncov/about/steps-when-sick.html    CDC: Ending Home Isolation: www.cdc.gov/coronavirus/2019-ncov/hcp/disposition-in-home-patients.html     CDC: Caring for Someone: www.cdc.gov/coronavirus/2019-ncov/if-you-are-sick/care-for-someone.html     [facility]: Interim Guidance for Hospital Discharge to Home: www.health.[email or web address]/diseases/coronavirus/hcp/hospdischarge.pdf    [facility] clinical trials (COVID-19 research studies): [email or web address]-trials     Below are the COVID-19 hotlines at the [facility] ([facility]). Interpreters are available.   o For health questions: Call 867-357-5472 or 1-711.383.6415 (7 a.m. to 7 p.m.)  o For questions about schools and childcare: Call 133-166-3970 or 1-175.507.5440 (7 a.m. to 7 p.m.)

## 2021-06-12 NOTE — PATIENT INSTRUCTIONS
Patient Education     Folliculitis  Folliculitis is an infection of a hair follicle. A hair follicle is the little pocket where a hair grows out of the skin. Bacteria normally live on the skin. But sometimes bacteria can get trapped in a follicle and cause infection. This causes a bumpy rash. The area over the follicles is red and raised. It may itch or be painful. The bumps may have fluid (pus) inside. The pus may leak and then form crusts. Sores can spread to other areas of the body. Once it goes away, folliculitis can come back at any time. Severe cases may cause lasting (permanent) hair loss and scarring.   Folliculitis can happen anywhere on the body where hair grows. It can be caused by rubbing from tight clothing. Ingrown hairs can cause it. Soaking in a hot tub or swimming pool that has bacteria in the water can cause it. It may also occur if a hair follicle is blocked by a bandage.   Sores often go away in a few days with no treatment. In some cases, medicine may be given. A small piece of skin or pus may be taken to find the type of bacteria causing the infection.   Home care  The healthcare provider may prescribe an antibiotic cream or ointment. Antibiotics taken by mouth (oral) may also be prescribed. Or you may be told to use an over-the-counter antibiotic cream. Follow all instructions when using any of these medicines.   General care    Apply warm, moist compresses to the sores for 20 minutes up to 3 times a day. You can make a compress by soaking a cloth in warm water. Squeeze out excess water.    Don t cut, poke, or squeeze the sores. This can be painful and spread infection.    Don t scratch the affected area. Scratching can delay healing.    Don t shave the areas affected by folliculitis.    If the sores leak fluid, cover the area with a nonstick gauze bandage. Use as little tape as possible. Carefully get rid of all soiled bandages.    Dress in loose cotton clothing.    Change sheets and  blankets if they are soiled by pus. Wash all clothes, towels, sheets, and cloth diapers in soap and hot water. Don't share clothes, towels, or sheets with other family members.    Don't soak the sores in bath water. This can spread infection. Instead keep the area clean by gently washing sores with soap and warm water.    Wash your hands or use antibacterial gels often to prevent spreading the bacteria.    Follow-up care  Follow up with your healthcare provider, or as advised.  When to get medical advice  Call your healthcare provider right away if any of these occur:    Fever of 100.4 F (38 C) or higher, or as directed by your provider    Rash spreads    Rash does not get better with treatment    Redness or swelling that gets worse    Rash becomes more painful    Foul-smelling fluid leaking from the skin    Rash improves, but then comes back   Jazzy last reviewed this educational content on 8/1/2019 2000-2021 [employer]. All rights reserved. This information is not intended as a substitute for professional medical care. Always follow your healthcare professional's instructions.

## 2021-06-12 NOTE — PROGRESS NOTES
ASSESSMENT/ PLAN:  Furuncle of right axilla     - cephALEXin (KEFLEX) 500 MG capsule; Take 1 capsule (500 mg) by mouth 3 times daily for 10 days    patient to monitor for signs or symptoms of worsening/ spreading infection.  Go to Urgent care or Emergency Department if worsening fevers/chills and/or spreading infection.  Warm, moist packs or towels can be applied to the region to aid in resolution of the infection.  Use Tylenol or ibuprofen for pain or fever.    Reassured patient that this sudden development of a tender mass in the axilla has all the characteristics of a hair follicle infection,  Does not have the typical presentation of a recurrent cancer    She should keep her usual mammogram schedule-  Return of fluctuant mass develops     ----------------------------------------------------------------------------------------------------------------------     SUBJECTIVE:   Chief Complaint   Patient presents with     Urgent Care     Breast Mass     Hx of breast cancer on right side-Lump under right arm pit     Jenifer Delong is a 50 year old female who presents for evaluation of and area of redness, tenderness, swelling and warmth of the skin that developed on the  right, axilla   .  Patient has had lumps, pain, skin erythema (reddened skin) and tenderness for 2 days.    Precipitating event was unknown, -  But she had right breast cancer surgery in the past- 5 years ago.  She is concerned about recurrence of cancer.  She is due to have her annual mammogram soon   Therapies tried: none.    Past Medical History:   Diagnosis Date     Abnormal Pap smear of cervix 1991/1992    normal colposcopy. normal paps since     Breast cancer      Depressive disorder      Hypertension      Migraine      Patient Active Problem List   Diagnosis     CARDIOVASCULAR SCREENING; LDL GOAL LESS THAN 160     Mild major depression (H)     Chemical dependency (H)     CTS (carpal tunnel syndrome)     Mechanical complication due to  intrauterine contraceptive device     Night sweats     Female pelvic pain     Mirena IUD placed 1/10/14     Implanon removal       ALLERGIES:  Hornet venom, Bee, and No known drug allergies    MEDs  BuPROPion HCl (WELLBUTRIN PO),   EPINEPHrine (EPIPEN 2-ADIS) 0.3 MG/0.3ML injection, Inject 0.3 mLs (0.3 mg) into the muscle once as needed for anaphylaxis (For sudden onset of difficulty breathing and or mouth/throat swelling)  ibuprofen (ADVIL/MOTRIN) 600 MG tablet, Take 1 tablet (600 mg) by mouth every 6 hours as needed for fever or pain Take with food.  levonorgestrel (MIRENA) 20 MCG/24HR IUD, 1 each by Intrauterine route once  LISINOPRIL PO, Take 10 mg by mouth  venlafaxine (EFFEXOR-XR) 150 MG 24 hr capsule,   albuterol (PROAIR HFA/PROVENTIL HFA/VENTOLIN HFA) 108 (90 Base) MCG/ACT inhaler, Inhale 2 puffs into the lungs every 6 hours as needed for shortness of breath / dyspnea or wheezing (Patient not taking: Reported on 2021)  ATORVASTATIN CALCIUM PO,   CARVEDILOL PO,   cyclobenzaprine (FLEXERIL) 10 MG tablet, Take 0.5-1 tablets (5-10 mg) by mouth 3 times daily as needed for muscle spasms (Patient not taking: Reported on 2021)    No current facility-administered medications on file prior to visit.       Social History     Tobacco Use     Smoking status: Never Smoker     Smokeless tobacco: Never Used   Substance Use Topics     Alcohol use: No     Comment: rare       Family History   Problem Relation Age of Onset     C.A.D. Paternal Grandmother          from MI at age of 66yo     Cancer Father         skin cancer excisions     Cerebrovascular Disease Father      Cancer - colorectal Paternal Grandfather      C.A.D. Paternal Uncle         s/p CABG x 4     Breast Cancer Paternal Aunt        ROS:  CONSTITUTIONAL:NEGATIVE for fever, chills,   EYES: NEGATIVE for vision changes or irritation  ENT/MOUTH: NEGATIVE for ear, mouth and throat problems  RESP:NEGATIVE for significant cough or SOB  GI: NEGATIVE for  nausea, abdominal pain,  , or change in bowel habits    OBJECTIVE:  BP (!) 146/93   Pulse 85   Temp 99.7  F (37.6  C) (Tympanic)   Resp 16   SpO2 99%   Breastfeeding No     SKIN: area involved is 3 cm by 2 cm on the right,  Axilla-  Has two areas of erythema pointing with slight purulence to the skin.  Area is solid, no fluctuance and very tender  .           GENERAL:  Alert, moderate distress  EYES: EOMI,   conjunctiva clear  HENT: External ears with no swelling or lesions   Nose and lips without  Swelling, ulcers, erythema or lesions  NECK: normal pain free ROM  RESP: no labored respirations, no tachypnea  EXTREMITIES:   Full ROM without expression of pain or limitation x 4 extremities  NEURO: Normal strength and tone, ambulation without difficulty,   normal speech and mentation

## 2021-09-19 ENCOUNTER — HEALTH MAINTENANCE LETTER (OUTPATIENT)
Age: 51
End: 2021-09-19

## 2022-01-09 ENCOUNTER — HEALTH MAINTENANCE LETTER (OUTPATIENT)
Age: 52
End: 2022-01-09

## 2022-05-01 ENCOUNTER — HEALTH MAINTENANCE LETTER (OUTPATIENT)
Age: 52
End: 2022-05-01

## 2022-11-21 ENCOUNTER — HEALTH MAINTENANCE LETTER (OUTPATIENT)
Age: 52
End: 2022-11-21

## 2023-03-22 ENCOUNTER — APPOINTMENT (OUTPATIENT)
Dept: GENERAL RADIOLOGY | Facility: CLINIC | Age: 53
End: 2023-03-22
Attending: EMERGENCY MEDICINE
Payer: COMMERCIAL

## 2023-03-22 ENCOUNTER — HOSPITAL ENCOUNTER (EMERGENCY)
Facility: CLINIC | Age: 53
Discharge: HOME | End: 2023-03-22
Attending: EMERGENCY MEDICINE | Admitting: EMERGENCY MEDICINE
Payer: COMMERCIAL

## 2023-03-22 VITALS
SYSTOLIC BLOOD PRESSURE: 143 MMHG | HEART RATE: 87 BPM | TEMPERATURE: 98.1 F | DIASTOLIC BLOOD PRESSURE: 81 MMHG | HEIGHT: 67 IN | RESPIRATION RATE: 18 BRPM | WEIGHT: 204 LBS | OXYGEN SATURATION: 99 % | BODY MASS INDEX: 32.02 KG/M2

## 2023-03-22 DIAGNOSIS — R63.8 DECREASED ORAL INTAKE: ICD-10-CM

## 2023-03-22 DIAGNOSIS — R59.0 CERVICAL ADENOPATHY: ICD-10-CM

## 2023-03-22 DIAGNOSIS — J03.90 EXUDATIVE TONSILLITIS: ICD-10-CM

## 2023-03-22 LAB
ALBUMIN SERPL BCG-MCNC: 4.6 G/DL (ref 3.5–5.2)
ALP SERPL-CCNC: 97 U/L (ref 35–104)
ALT SERPL W P-5'-P-CCNC: 20 U/L (ref 10–35)
ANION GAP SERPL CALCULATED.3IONS-SCNC: 14 MMOL/L (ref 7–15)
AST SERPL W P-5'-P-CCNC: 21 U/L (ref 10–35)
BASOPHILS # BLD AUTO: 0 10E3/UL (ref 0–0.2)
BASOPHILS NFR BLD AUTO: 0 %
BILIRUB SERPL-MCNC: 0.6 MG/DL
BUN SERPL-MCNC: 17.1 MG/DL (ref 6–20)
CALCIUM SERPL-MCNC: 9.7 MG/DL (ref 8.6–10)
CHLORIDE SERPL-SCNC: 102 MMOL/L (ref 98–107)
CREAT SERPL-MCNC: 0.84 MG/DL (ref 0.51–0.95)
DEPRECATED HCO3 PLAS-SCNC: 23 MMOL/L (ref 22–29)
EOSINOPHIL # BLD AUTO: 0.1 10E3/UL (ref 0–0.7)
EOSINOPHIL NFR BLD AUTO: 0 %
ERYTHROCYTE [DISTWIDTH] IN BLOOD BY AUTOMATED COUNT: 14.6 % (ref 10–15)
GFR SERPL CREATININE-BSD FRML MDRD: 83 ML/MIN/1.73M2
GLUCOSE SERPL-MCNC: 103 MG/DL (ref 70–99)
HCT VFR BLD AUTO: 38.3 % (ref 35–47)
HGB BLD-MCNC: 12.7 G/DL (ref 11.7–15.7)
HOLD SPECIMEN: NORMAL
HOLD SPECIMEN: NORMAL
IMM GRANULOCYTES # BLD: 0.1 10E3/UL
IMM GRANULOCYTES NFR BLD: 1 %
LACTATE SERPL-SCNC: 0.8 MMOL/L (ref 0.7–2)
LYMPHOCYTES # BLD AUTO: 1.7 10E3/UL (ref 0.8–5.3)
LYMPHOCYTES NFR BLD AUTO: 10 %
MCH RBC QN AUTO: 28.3 PG (ref 26.5–33)
MCHC RBC AUTO-ENTMCNC: 33.2 G/DL (ref 31.5–36.5)
MCV RBC AUTO: 86 FL (ref 78–100)
MONOCYTES # BLD AUTO: 1.1 10E3/UL (ref 0–1.3)
MONOCYTES NFR BLD AUTO: 6 %
MONOCYTES NFR BLD AUTO: NEGATIVE %
NEUTROPHILS # BLD AUTO: 14.5 10E3/UL (ref 1.6–8.3)
NEUTROPHILS NFR BLD AUTO: 83 %
NRBC # BLD AUTO: 0 10E3/UL
NRBC BLD AUTO-RTO: 0 /100
PLATELET # BLD AUTO: 366 10E3/UL (ref 150–450)
POTASSIUM SERPL-SCNC: 3.7 MMOL/L (ref 3.4–5.3)
PROT SERPL-MCNC: 8.2 G/DL (ref 6.4–8.3)
RBC # BLD AUTO: 4.48 10E6/UL (ref 3.8–5.2)
SODIUM SERPL-SCNC: 139 MMOL/L (ref 136–145)
WBC # BLD AUTO: 17.4 10E3/UL (ref 4–11)

## 2023-03-22 PROCEDURE — 96375 TX/PRO/DX INJ NEW DRUG ADDON: CPT

## 2023-03-22 PROCEDURE — 99285 EMERGENCY DEPT VISIT HI MDM: CPT | Mod: 25

## 2023-03-22 PROCEDURE — 71046 X-RAY EXAM CHEST 2 VIEWS: CPT

## 2023-03-22 PROCEDURE — 86308 HETEROPHILE ANTIBODY SCREEN: CPT | Performed by: EMERGENCY MEDICINE

## 2023-03-22 PROCEDURE — 83605 ASSAY OF LACTIC ACID: CPT | Performed by: EMERGENCY MEDICINE

## 2023-03-22 PROCEDURE — 250N000011 HC RX IP 250 OP 636: Performed by: EMERGENCY MEDICINE

## 2023-03-22 PROCEDURE — 85025 COMPLETE CBC W/AUTO DIFF WBC: CPT | Performed by: EMERGENCY MEDICINE

## 2023-03-22 PROCEDURE — 84155 ASSAY OF PROTEIN SERUM: CPT | Performed by: EMERGENCY MEDICINE

## 2023-03-22 PROCEDURE — 96361 HYDRATE IV INFUSION ADD-ON: CPT

## 2023-03-22 PROCEDURE — 258N000003 HC RX IP 258 OP 636: Performed by: EMERGENCY MEDICINE

## 2023-03-22 PROCEDURE — 70360 X-RAY EXAM OF NECK: CPT

## 2023-03-22 PROCEDURE — 36415 COLL VENOUS BLD VENIPUNCTURE: CPT | Performed by: EMERGENCY MEDICINE

## 2023-03-22 PROCEDURE — 96374 THER/PROPH/DIAG INJ IV PUSH: CPT

## 2023-03-22 RX ORDER — OXYCODONE HCL 5 MG/5 ML
2.5-5 SOLUTION, ORAL ORAL EVERY 6 HOURS PRN
Qty: 60 ML | Refills: 0 | Status: SHIPPED | OUTPATIENT
Start: 2023-03-22 | End: 2023-03-25

## 2023-03-22 RX ORDER — DEXAMETHASONE SODIUM PHOSPHATE 10 MG/ML
10 INJECTION, SOLUTION INTRAMUSCULAR; INTRAVENOUS ONCE
Status: COMPLETED | OUTPATIENT
Start: 2023-03-22 | End: 2023-03-22

## 2023-03-22 RX ORDER — KETOROLAC TROMETHAMINE 15 MG/ML
15 INJECTION, SOLUTION INTRAMUSCULAR; INTRAVENOUS ONCE
Status: COMPLETED | OUTPATIENT
Start: 2023-03-22 | End: 2023-03-22

## 2023-03-22 RX ADMIN — DEXAMETHASONE SODIUM PHOSPHATE 10 MG: 10 INJECTION INTRAMUSCULAR; INTRAVENOUS at 14:51

## 2023-03-22 RX ADMIN — KETOROLAC TROMETHAMINE 15 MG: 15 INJECTION, SOLUTION INTRAMUSCULAR; INTRAVENOUS at 14:38

## 2023-03-22 RX ADMIN — SODIUM CHLORIDE 1000 ML: 9 INJECTION, SOLUTION INTRAVENOUS at 14:52

## 2023-03-22 ASSESSMENT — ENCOUNTER SYMPTOMS
SORE THROAT: 1
SLEEP DISTURBANCE: 1
VOICE CHANGE: 1
TROUBLE SWALLOWING: 1
COUGH: 1
MYALGIAS: 1
FEVER: 1

## 2023-03-22 ASSESSMENT — ACTIVITIES OF DAILY LIVING (ADL)
ADLS_ACUITY_SCORE: 35
ADLS_ACUITY_SCORE: 35

## 2023-03-22 NOTE — Clinical Note
Jenifer Delong was seen and treated in our emergency department on 3/22/2023.    Return to work is based on symptom improvement expected in 3-5 days.     Sincerely,     [facility] Emergency Dept

## 2023-03-22 NOTE — ED PROVIDER NOTES
History     Chief Complaint:  Pharyngitis       HPI   Jenifer Delong is a 52 year old female who presents with pharyngitis. Patient started feeling sick 3 days ago. Yesterday she had negative strep and covid. She reports that today her tonsils are much more swollen. She says that she has not eaten or drank anything the past two days due to the pain. She has tried to take ibuprofen and tylenol but cannot swallow them. She denies chest pain. The patient also says that she gets short of breath when going up the stairs. She also endorses myalgias, fever and cough, noting that the cough makes it much worse. The patient says that she can't sleep because when she lays down the cough and pain gets much worse. Had penicillin shot yesterday at .    Independent Historian:   None - Patient Only    Review of External Notes: No note from yesterday available in CareEverywhere     ROS:  Review of Systems   Constitutional: Positive for fever.   HENT: Positive for sore throat, trouble swallowing and voice change.    Respiratory: Positive for cough.    Cardiovascular: Negative for chest pain.   Musculoskeletal: Positive for myalgias.   Psychiatric/Behavioral: Positive for sleep disturbance.   All other systems reviewed and are negative.    Allergies:  Hornet Venom  Bee  No Known Drug Allergies     Medications:    Albuterol   Atorvastatin  Bupropion  Carvedilol  Cyclobenzaprine  Epipen  Levonorgestrel  Lisinopril  Venlafaxine    Past Medical History:    Breast cancer  Depressive disorder  Hypertension   Migraine  Anxiety  Aorta disorder  Opioid abuse   Hypertriglyceridemia  MRSA    Past Surgical History:      Diagnostic laparoscopy  Mastectomy breast  Hysteroscopy      Family History:    Breast cancer  Colorectal cancer   CVD    Social History:  Reports that she has never smoked. She has never used smokeless tobacco. She reports that she does not drink alcohol and does not use drugs.  PCP: Debbi Quintero     Physical Exam  "    Patient Vitals for the past 24 hrs:   BP Temp Temp src Pulse Resp SpO2 Height Weight   03/22/23 1715 (!) 143/81 98.1  F (36.7  C) Oral 87 18 99 % -- --   03/22/23 1219 (!) 149/105 (!) 101.5  F (38.6  C) Oral 120 20 98 % 1.702 m (5' 7\") 92.5 kg (204 lb)      Physical Exam  Resp:               Non-labored, CTAB, voice hoarse but not muffled  CV:                  RRR  ENT:                Exudative tonsillitis bilaterally, uvula midline, tender bilateral cervical adenopathy, tolerating own secretions  Neuro:             Alert and cooperative  MSkel:             Moving all extremities  Skin:                No rash    Emergency Department Course     Imaging:  Chest XR,  PA & LAT   Final Result   IMPRESSION: No acute cardiopulmonary disease.      DAMARIS RODRIGUEZ MD            SYSTEM ID:  [id number]      Neck soft tissue XR   Final Result   IMPRESSION: Soft tissue contours of the aerodigestive tract from the   oropharynx down to the upper trachea appear normal. No radiopaque   foreign bodies. No soft tissue gas.      ADRIAN HERNANDEZ MD            SYSTEM ID:  [id number]         Report per radiology    Laboratory:  Labs Ordered and Resulted from Time of ED Arrival to Time of ED Departure   COMPREHENSIVE METABOLIC PANEL - Abnormal       Result Value    Sodium 139      Potassium 3.7      Chloride 102      Carbon Dioxide (CO2) 23      Anion Gap 14      Urea Nitrogen 17.1      Creatinine 0.84      Calcium 9.7      Glucose 103 (*)     Alkaline Phosphatase 97      AST 21      ALT 20      Protein Total 8.2      Albumin 4.6      Bilirubin Total 0.6      GFR Estimate 83     CBC WITH PLATELETS AND DIFFERENTIAL - Abnormal    WBC Count 17.4 (*)     RBC Count 4.48      Hemoglobin 12.7      Hematocrit 38.3      MCV 86      MCH 28.3      MCHC 33.2      RDW 14.6      Platelet Count 366      % Neutrophils 83      % Lymphocytes 10      % Monocytes 6      % Eosinophils 0      % Basophils 0      % Immature Granulocytes 1      NRBCs per 100 WBC " 0      Absolute Neutrophils 14.5 (*)     Absolute Lymphocytes 1.7      Absolute Monocytes 1.1      Absolute Eosinophils 0.1      Absolute Basophils 0.0      Absolute Immature Granulocytes 0.1      Absolute NRBCs 0.0     LACTIC ACID WHOLE BLOOD - Normal    Lactic Acid 0.8     MONONUCLEOSIS SCREEN - Normal    Mononucleosis Screen Negative        Emergency Department Course & Assessments:    Interventions:  Medications   0.9% sodium chloride BOLUS (0 mLs Intravenous Stopped 3/22/23 1530)   ketorolac (TORADOL) injection 15 mg (15 mg Intravenous $Given 3/22/23 1438)   dexamethasone PF (DECADRON) injection 10 mg (10 mg Intravenous $Given 3/22/23 1451)      Assessments:  1341 I examined the patient and obtained history as noted above.     Independent Interpretation (X-rays, CTs, rhythm strip):  X-rays reviewed, specifically area of epiglottis, no pneumonia seen    Consultations/Discussion of Management or Tests:  None        Social Determinants of Health affecting care:   None    Disposition:  The patient was discharged to home.     Impression & Plan      Medical Decision Making:  With her exudative tonsillitis and adenopathy, strep is high on the differential.  She is already received penicillin for this.  Retesting was not performed.  Mononucleosis screening was performed and also returned negative.  She does not have any severe electrolyte abnormalities or renal insufficiency secondary to dehydration.  White blood cell count was noted to be elevated.  If this time infection requiring new antibiotics was not identified.  She received IV fluids and medications.  Anticipate the steroids will continue to help with symptom management over the next 12 to 24 hours.  We will be using liquid medications which she states she she will be able to swallow.  Prescribed medications include pain medications.  Return immediately if unable to tolerate secretions or is otherwise worsening.  At this time based on her evaluation, deep space  infection of the neck is not suspected.      Diagnosis:    ICD-10-CM    1. Exudative tonsillitis  J03.90       2. Cervical adenopathy  R59.0       3. Decreased oral intake  R63.8            Discharge Medications:  Discharge Medication List as of 3/22/2023  5:18 PM      START taking these medications    Details   oxyCODONE (ROXICODONE) 5 MG/5ML solution Take 2.5-5 mLs (2.5-5 mg) by mouth every 6 hours as needed for pain, Disp-60 mL, R-0, E-Prescribe            Scribe Disclosure:  I, Sami Shah, am serving as a scribe at 4:59 PM on 3/22/2023 to document services personally performed by Nathaly Choi MD based on my observations and the provider's statements to me.     3/22/2023   Nathaly Choi MD Gosen, Christine Leigh, MD  03/22/23 8190

## 2023-03-22 NOTE — ED TRIAGE NOTES
Pt presents for evaluation of swollen tonsils and difficulty swallowing. Started to feel sick on Sunday. Monday, sore throat with drainage started. Today, lymph nodes swollen with muffled voice. Unable to swallow due to pain. Went to an UC yesterday, had a negative strep and COVID test, but was given a penicillin shot. Took liquid tylenol and ibuprofen around 1000.

## 2023-03-22 NOTE — DISCHARGE INSTRUCTIONS
Prescription strength dosing instructions:  - 600mg ibuprofen (Advil, Motrin) every 6 hours as needed for fever/pain/inflammation.  Naprosyn (Naproxen, Aleve) works similarly and can be used instead, if preferred.  - 650mg acetaminophen (tylenol) every 4-6 hours or 1000mg every 6-8 hours (maximum of 3000mg in 24 hours).  Acetaminophen is often in combination over the counter and prescription pain medications.  Be sure to include this in daily total.    These medications work differently and can be used in combination.        Over the counter products with Benzocaine (throat lozenges, chloraseptic spray) provide a numbing effect.  Do not take more than the container recommends.

## 2023-03-22 NOTE — ED NOTES
Rapid Assessment Note    History:   Jenifer Delong is a 52 year old female who presents with pharyngitis. Patient started feeling sick 3 days ago. Yesterday she had negative strep and covid. She reports that today her tonsils are much more swollen. She says that she has not eaten or drank anything the past two days due to the pain. She has tried to take ibuprofen and tylenol but cannot swallow them. She denies chest pain. The patient also says that she gets short of breath when going up the stairs. She also endorses myalgias, fever and cough, noting that the cough makes it much worse. The patient says that she can't sleep because when she lays down the cough and pain gets much worse. Had penicillin shot yesterday at .    Exam:   Resp:  Non-labored, CTAB, voice hoarse but not muffled  CV:  RRR  ENT:  Exudative tonsillitis bilaterally, uvula midline, tender bilateral cervical adenopathy  Neuro:  Alert and cooperative  MSkel:  Moving all extremities  Skin:  No rash    Plan of Care:   I evaluated the patient and developed an initial plan of care. I discussed this plan and explained that I, or one of my partners, would be returning to complete the evaluation.     Labs, fluids, symptomatic medications    I, Sami Shah, am serving as a scribe to document services personally performed by Nathaly Choi MD, based on my observations and the provider's statements to me.    3/22/2023  [facility]    Portions of this medical record were completed by a scribe. UPON MY REVIEW AND AUTHENTICATION BY ELECTRONIC SIGNATURE, this confirms (a) I performed the applicable clinical services, and (b) the record is accurate.        Nathaly Choi MD  03/22/23 0784

## 2023-04-16 ENCOUNTER — HEALTH MAINTENANCE LETTER (OUTPATIENT)
Age: 53
End: 2023-04-16

## 2023-06-02 ENCOUNTER — HEALTH MAINTENANCE LETTER (OUTPATIENT)
Age: 53
End: 2023-06-02

## 2023-07-23 ENCOUNTER — APPOINTMENT (OUTPATIENT)
Dept: GENERAL RADIOLOGY | Facility: CLINIC | Age: 53
End: 2023-07-23
Attending: EMERGENCY MEDICINE
Payer: COMMERCIAL

## 2023-07-23 ENCOUNTER — HOSPITAL ENCOUNTER (EMERGENCY)
Facility: CLINIC | Age: 53
Discharge: HOME | End: 2023-07-23
Attending: EMERGENCY MEDICINE | Admitting: EMERGENCY MEDICINE
Payer: COMMERCIAL

## 2023-07-23 VITALS
TEMPERATURE: 97.6 F | SYSTOLIC BLOOD PRESSURE: 131 MMHG | RESPIRATION RATE: 16 BRPM | HEART RATE: 99 BPM | DIASTOLIC BLOOD PRESSURE: 96 MMHG | OXYGEN SATURATION: 98 %

## 2023-07-23 DIAGNOSIS — S60.211A CONTUSION OF RIGHT WRIST, INITIAL ENCOUNTER: ICD-10-CM

## 2023-07-23 DIAGNOSIS — S30.0XXA CONTUSION OF BUTTOCK, INITIAL ENCOUNTER: ICD-10-CM

## 2023-07-23 DIAGNOSIS — S80.01XA CONTUSION OF RIGHT KNEE, INITIAL ENCOUNTER: ICD-10-CM

## 2023-07-23 PROCEDURE — 73562 X-RAY EXAM OF KNEE 3: CPT | Mod: RT

## 2023-07-23 PROCEDURE — 99285 EMERGENCY DEPT VISIT HI MDM: CPT

## 2023-07-23 PROCEDURE — 72220 X-RAY EXAM SACRUM TAILBONE: CPT

## 2023-07-23 PROCEDURE — 73130 X-RAY EXAM OF HAND: CPT | Mod: RT

## 2023-07-23 PROCEDURE — 250N000013 HC RX MED GY IP 250 OP 250 PS 637: Performed by: EMERGENCY MEDICINE

## 2023-07-23 PROCEDURE — 73110 X-RAY EXAM OF WRIST: CPT | Mod: RT

## 2023-07-23 PROCEDURE — 73502 X-RAY EXAM HIP UNI 2-3 VIEWS: CPT

## 2023-07-23 RX ORDER — OXYCODONE AND ACETAMINOPHEN 5; 325 MG/1; MG/1
2 TABLET ORAL ONCE
Status: COMPLETED | OUTPATIENT
Start: 2023-07-23 | End: 2023-07-23

## 2023-07-23 RX ORDER — OXYCODONE HYDROCHLORIDE 5 MG/1
5 TABLET ORAL EVERY 6 HOURS PRN
Qty: 6 TABLET | Refills: 0 | Status: ON HOLD | OUTPATIENT
Start: 2023-07-23 | End: 2023-08-20

## 2023-07-23 RX ADMIN — OXYCODONE HYDROCHLORIDE AND ACETAMINOPHEN 2 TABLET: 5; 325 TABLET ORAL at 14:25

## 2023-07-23 ASSESSMENT — ACTIVITIES OF DAILY LIVING (ADL): ADLS_ACUITY_SCORE: 35
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
## 2023-08-20 ENCOUNTER — HOSPITAL ENCOUNTER (OUTPATIENT)
Facility: CLINIC | Age: 53
Setting detail: OBSERVATION
Discharge: HOME | End: 2023-08-20
Attending: EMERGENCY MEDICINE | Admitting: INTERNAL MEDICINE
Payer: COMMERCIAL

## 2023-08-20 ENCOUNTER — APPOINTMENT (OUTPATIENT)
Dept: CT IMAGING | Facility: CLINIC | Age: 53
End: 2023-08-20
Attending: EMERGENCY MEDICINE
Payer: COMMERCIAL

## 2023-08-20 VITALS
HEIGHT: 67 IN | BODY MASS INDEX: 33.93 KG/M2 | SYSTOLIC BLOOD PRESSURE: 120 MMHG | RESPIRATION RATE: 19 BRPM | WEIGHT: 216.2 LBS | DIASTOLIC BLOOD PRESSURE: 79 MMHG | TEMPERATURE: 98.7 F | OXYGEN SATURATION: 97 % | HEART RATE: 76 BPM

## 2023-08-20 DIAGNOSIS — H60.502 ACUTE OTITIS EXTERNA OF LEFT EAR, UNSPECIFIED TYPE: ICD-10-CM

## 2023-08-20 DIAGNOSIS — R93.89 ABNORMAL CT SCAN: ICD-10-CM

## 2023-08-20 DIAGNOSIS — L03.211 FACIAL CELLULITIS: ICD-10-CM

## 2023-08-20 DIAGNOSIS — H66.90 ACUTE OTITIS MEDIA, UNSPECIFIED OTITIS MEDIA TYPE: ICD-10-CM

## 2023-08-20 LAB
ANION GAP SERPL CALCULATED.3IONS-SCNC: 11 MMOL/L (ref 7–15)
BASOPHILS # BLD AUTO: 0.1 10E3/UL (ref 0–0.2)
BASOPHILS NFR BLD AUTO: 1 %
BUN SERPL-MCNC: 12.9 MG/DL (ref 6–20)
CALCIUM SERPL-MCNC: 9.4 MG/DL (ref 8.6–10)
CHLORIDE SERPL-SCNC: 102 MMOL/L (ref 98–107)
CREAT SERPL-MCNC: 0.78 MG/DL (ref 0.51–0.95)
DEPRECATED HCO3 PLAS-SCNC: 23 MMOL/L (ref 22–29)
EOSINOPHIL # BLD AUTO: 0.3 10E3/UL (ref 0–0.7)
EOSINOPHIL NFR BLD AUTO: 3 %
ERYTHROCYTE [DISTWIDTH] IN BLOOD BY AUTOMATED COUNT: 13.3 % (ref 10–15)
GFR SERPL CREATININE-BSD FRML MDRD: >90 ML/MIN/1.73M2
GLUCOSE SERPL-MCNC: 139 MG/DL (ref 70–99)
HBA1C MFR BLD: 6.3 %
HCT VFR BLD AUTO: 36.7 % (ref 35–47)
HGB BLD-MCNC: 12.3 G/DL (ref 11.7–15.7)
IMM GRANULOCYTES # BLD: 0.1 10E3/UL
IMM GRANULOCYTES NFR BLD: 1 %
LACTATE SERPL-SCNC: 1.6 MMOL/L (ref 0.7–2)
LYMPHOCYTES # BLD AUTO: 2.3 10E3/UL (ref 0.8–5.3)
LYMPHOCYTES NFR BLD AUTO: 26 %
MCH RBC QN AUTO: 28.9 PG (ref 26.5–33)
MCHC RBC AUTO-ENTMCNC: 33.5 G/DL (ref 31.5–36.5)
MCV RBC AUTO: 86 FL (ref 78–100)
MONOCYTES # BLD AUTO: 0.6 10E3/UL (ref 0–1.3)
MONOCYTES NFR BLD AUTO: 7 %
NEUTROPHILS # BLD AUTO: 5.7 10E3/UL (ref 1.6–8.3)
NEUTROPHILS NFR BLD AUTO: 62 %
NRBC # BLD AUTO: 0 10E3/UL
NRBC BLD AUTO-RTO: 0 /100
PLATELET # BLD AUTO: 323 10E3/UL (ref 150–450)
POTASSIUM SERPL-SCNC: 4.1 MMOL/L (ref 3.4–5.3)
RBC # BLD AUTO: 4.25 10E6/UL (ref 3.8–5.2)
SODIUM SERPL-SCNC: 136 MMOL/L (ref 136–145)
WBC # BLD AUTO: 9 10E3/UL (ref 4–11)

## 2023-08-20 PROCEDURE — 250N000011 HC RX IP 250 OP 636: Performed by: EMERGENCY MEDICINE

## 2023-08-20 PROCEDURE — 85025 COMPLETE CBC W/AUTO DIFF WBC: CPT | Performed by: EMERGENCY MEDICINE

## 2023-08-20 PROCEDURE — 83036 HEMOGLOBIN GLYCOSYLATED A1C: CPT | Performed by: PHYSICIAN ASSISTANT

## 2023-08-20 PROCEDURE — G0378 HOSPITAL OBSERVATION PER HR: HCPCS

## 2023-08-20 PROCEDURE — 80048 BASIC METABOLIC PNL TOTAL CA: CPT | Performed by: EMERGENCY MEDICINE

## 2023-08-20 PROCEDURE — 36415 COLL VENOUS BLD VENIPUNCTURE: CPT | Performed by: EMERGENCY MEDICINE

## 2023-08-20 PROCEDURE — 96376 TX/PRO/DX INJ SAME DRUG ADON: CPT | Mod: XU

## 2023-08-20 PROCEDURE — 70491 CT SOFT TISSUE NECK W/DYE: CPT

## 2023-08-20 PROCEDURE — 250N000009 HC RX 250: Performed by: EMERGENCY MEDICINE

## 2023-08-20 PROCEDURE — 99285 EMERGENCY DEPT VISIT HI MDM: CPT | Mod: 25

## 2023-08-20 PROCEDURE — 250N000013 HC RX MED GY IP 250 OP 250 PS 637: Performed by: PHYSICIAN ASSISTANT

## 2023-08-20 PROCEDURE — 99207 PR APP CREDIT; MD BILLING SHARED VISIT: CPT | Performed by: PHYSICIAN ASSISTANT

## 2023-08-20 PROCEDURE — 83605 ASSAY OF LACTIC ACID: CPT | Performed by: EMERGENCY MEDICINE

## 2023-08-20 PROCEDURE — 96374 THER/PROPH/DIAG INJ IV PUSH: CPT | Mod: 59

## 2023-08-20 PROCEDURE — 250N000013 HC RX MED GY IP 250 OP 250 PS 637: Performed by: EMERGENCY MEDICINE

## 2023-08-20 PROCEDURE — 96365 THER/PROPH/DIAG IV INF INIT: CPT

## 2023-08-20 PROCEDURE — 99207 PR NO BILLABLE SERVICE THIS VISIT: CPT | Performed by: PHYSICIAN ASSISTANT

## 2023-08-20 PROCEDURE — 99223 1ST HOSP IP/OBS HIGH 75: CPT | Mod: AI | Performed by: INTERNAL MEDICINE

## 2023-08-20 PROCEDURE — 96375 TX/PRO/DX INJ NEW DRUG ADDON: CPT

## 2023-08-20 PROCEDURE — 250N000011 HC RX IP 250 OP 636: Mod: JZ | Performed by: INTERNAL MEDICINE

## 2023-08-20 PROCEDURE — 250N000011 HC RX IP 250 OP 636: Mod: JZ | Performed by: PHYSICIAN ASSISTANT

## 2023-08-20 PROCEDURE — 250N000013 HC RX MED GY IP 250 OP 250 PS 637: Performed by: INTERNAL MEDICINE

## 2023-08-20 RX ORDER — NALOXONE HYDROCHLORIDE 0.4 MG/ML
0.2 INJECTION, SOLUTION INTRAMUSCULAR; INTRAVENOUS; SUBCUTANEOUS
Status: DISCONTINUED | OUTPATIENT
Start: 2023-08-20 | End: 2023-08-20 | Stop reason: HOSPADM

## 2023-08-20 RX ORDER — OXYCODONE HYDROCHLORIDE 5 MG/1
5-10 TABLET ORAL EVERY 4 HOURS PRN
Qty: 10 TABLET | Refills: 0 | Status: SHIPPED | OUTPATIENT
Start: 2023-08-20 | End: 2023-09-23

## 2023-08-20 RX ORDER — IOPAMIDOL 755 MG/ML
500 INJECTION, SOLUTION INTRAVASCULAR ONCE
Status: COMPLETED | OUTPATIENT
Start: 2023-08-20 | End: 2023-08-20

## 2023-08-20 RX ORDER — OXYCODONE HYDROCHLORIDE 5 MG/1
5 TABLET ORAL ONCE
Status: COMPLETED | OUTPATIENT
Start: 2023-08-20 | End: 2023-08-20

## 2023-08-20 RX ORDER — AMOXICILLIN 500 MG/1
1000 CAPSULE ORAL DAILY
Status: ON HOLD | COMMUNITY
End: 2023-08-20

## 2023-08-20 RX ORDER — ATORVASTATIN CALCIUM 20 MG/1
20 TABLET, FILM COATED ORAL EVERY EVENING
Status: DISCONTINUED | OUTPATIENT
Start: 2023-08-20 | End: 2023-08-20

## 2023-08-20 RX ORDER — KETOROLAC TROMETHAMINE 15 MG/ML
15 INJECTION, SOLUTION INTRAMUSCULAR; INTRAVENOUS ONCE
Status: COMPLETED | OUTPATIENT
Start: 2023-08-20 | End: 2023-08-20

## 2023-08-20 RX ORDER — IBUPROFEN 200 MG
200 TABLET ORAL PRN
Status: ON HOLD | COMMUNITY
End: 2023-08-20

## 2023-08-20 RX ORDER — OXYCODONE HYDROCHLORIDE 5 MG/1
5-10 TABLET ORAL EVERY 4 HOURS PRN
Status: DISCONTINUED | OUTPATIENT
Start: 2023-08-20 | End: 2023-08-20 | Stop reason: HOSPADM

## 2023-08-20 RX ORDER — ONDANSETRON 2 MG/ML
4 INJECTION INTRAMUSCULAR; INTRAVENOUS EVERY 6 HOURS PRN
Status: DISCONTINUED | OUTPATIENT
Start: 2023-08-20 | End: 2023-08-20 | Stop reason: HOSPADM

## 2023-08-20 RX ORDER — CIPROFLOXACIN AND DEXAMETHASONE 3; 1 MG/ML; MG/ML
4 SUSPENSION/ DROPS AURICULAR (OTIC) 2 TIMES DAILY
Qty: 3.2 ML | Refills: 0 | COMMUNITY
Start: 2023-08-20 | End: 2023-09-23

## 2023-08-20 RX ORDER — ONDANSETRON 4 MG/1
4 TABLET, ORALLY DISINTEGRATING ORAL EVERY 6 HOURS PRN
Status: DISCONTINUED | OUTPATIENT
Start: 2023-08-20 | End: 2023-08-20 | Stop reason: HOSPADM

## 2023-08-20 RX ORDER — AMPICILLIN AND SULBACTAM 2; 1 G/1; G/1
3 INJECTION, POWDER, FOR SOLUTION INTRAMUSCULAR; INTRAVENOUS ONCE
Status: COMPLETED | OUTPATIENT
Start: 2023-08-20 | End: 2023-08-20

## 2023-08-20 RX ORDER — AMPICILLIN AND SULBACTAM 2; 1 G/1; G/1
3 INJECTION, POWDER, FOR SOLUTION INTRAMUSCULAR; INTRAVENOUS EVERY 6 HOURS
Status: DISCONTINUED | OUTPATIENT
Start: 2023-08-20 | End: 2023-08-20 | Stop reason: HOSPADM

## 2023-08-20 RX ORDER — HYDROMORPHONE HYDROCHLORIDE 1 MG/ML
0.5 INJECTION, SOLUTION INTRAMUSCULAR; INTRAVENOUS; SUBCUTANEOUS ONCE
Status: COMPLETED | OUTPATIENT
Start: 2023-08-20 | End: 2023-08-20

## 2023-08-20 RX ORDER — BUPROPION HYDROCHLORIDE 150 MG/1
150 TABLET, EXTENDED RELEASE ORAL 2 TIMES DAILY
Status: DISCONTINUED | OUTPATIENT
Start: 2023-08-20 | End: 2023-08-20 | Stop reason: HOSPADM

## 2023-08-20 RX ORDER — VENLAFAXINE HYDROCHLORIDE 75 MG/1
225 CAPSULE, EXTENDED RELEASE ORAL
Status: DISCONTINUED | OUTPATIENT
Start: 2023-08-20 | End: 2023-08-20 | Stop reason: HOSPADM

## 2023-08-20 RX ORDER — CIPROFLOXACIN AND DEXAMETHASONE 3; 1 MG/ML; MG/ML
4 SUSPENSION/ DROPS AURICULAR (OTIC) 4 TIMES DAILY
Status: ON HOLD | COMMUNITY
End: 2023-08-20

## 2023-08-20 RX ORDER — CIPROFLOXACIN AND DEXAMETHASONE 3; 1 MG/ML; MG/ML
4 SUSPENSION/ DROPS AURICULAR (OTIC) 2 TIMES DAILY
Status: DISCONTINUED | OUTPATIENT
Start: 2023-08-20 | End: 2023-08-20 | Stop reason: HOSPADM

## 2023-08-20 RX ORDER — LISINOPRIL 10 MG/1
10 TABLET ORAL DAILY
Status: DISCONTINUED | OUTPATIENT
Start: 2023-08-20 | End: 2023-08-20 | Stop reason: HOSPADM

## 2023-08-20 RX ORDER — NALOXONE HYDROCHLORIDE 0.4 MG/ML
0.4 INJECTION, SOLUTION INTRAMUSCULAR; INTRAVENOUS; SUBCUTANEOUS
Status: DISCONTINUED | OUTPATIENT
Start: 2023-08-20 | End: 2023-08-20 | Stop reason: HOSPADM

## 2023-08-20 RX ORDER — IBUPROFEN 200 MG
600 TABLET ORAL 3 TIMES DAILY
Qty: 45 TABLET | Refills: 0 | Status: SHIPPED | OUTPATIENT
Start: 2023-08-20 | End: 2023-08-25

## 2023-08-20 RX ORDER — IBUPROFEN 600 MG/1
600 TABLET, FILM COATED ORAL 3 TIMES DAILY
Status: DISCONTINUED | OUTPATIENT
Start: 2023-08-20 | End: 2023-08-20 | Stop reason: HOSPADM

## 2023-08-20 RX ORDER — BUPROPION HYDROCHLORIDE 150 MG/1
150 TABLET, EXTENDED RELEASE ORAL 2 TIMES DAILY
COMMUNITY

## 2023-08-20 RX ORDER — KETOROLAC TROMETHAMINE 30 MG/ML
30 INJECTION, SOLUTION INTRAMUSCULAR; INTRAVENOUS EVERY 6 HOURS PRN
Status: DISCONTINUED | OUTPATIENT
Start: 2023-08-20 | End: 2023-08-20

## 2023-08-20 RX ORDER — ALBUTEROL SULFATE 90 UG/1
2 AEROSOL, METERED RESPIRATORY (INHALATION) EVERY 6 HOURS PRN
Status: DISCONTINUED | OUTPATIENT
Start: 2023-08-20 | End: 2023-08-20 | Stop reason: HOSPADM

## 2023-08-20 RX ADMIN — AMPICILLIN SODIUM AND SULBACTAM SODIUM 3 G: 2; 1 INJECTION, POWDER, FOR SOLUTION INTRAMUSCULAR; INTRAVENOUS at 14:30

## 2023-08-20 RX ADMIN — IBUPROFEN 600 MG: 600 TABLET ORAL at 14:29

## 2023-08-20 RX ADMIN — KETOROLAC TROMETHAMINE 15 MG: 15 INJECTION, SOLUTION INTRAMUSCULAR; INTRAVENOUS at 01:56

## 2023-08-20 RX ADMIN — HYDROMORPHONE HYDROCHLORIDE 0.5 MG: 1 INJECTION, SOLUTION INTRAMUSCULAR; INTRAVENOUS; SUBCUTANEOUS at 04:07

## 2023-08-20 RX ADMIN — CIPROFLOXACIN AND DEXAMETHASONE 4 DROP: 3; 1 SUSPENSION/ DROPS AURICULAR (OTIC) at 09:17

## 2023-08-20 RX ADMIN — VENLAFAXINE HYDROCHLORIDE 75 MG: 75 CAPSULE, EXTENDED RELEASE ORAL at 10:12

## 2023-08-20 RX ADMIN — OXYCODONE HYDROCHLORIDE 5 MG: 5 TABLET ORAL at 01:40

## 2023-08-20 RX ADMIN — KETOROLAC TROMETHAMINE 30 MG: 30 INJECTION, SOLUTION INTRAMUSCULAR; INTRAVENOUS at 09:13

## 2023-08-20 RX ADMIN — SODIUM CHLORIDE 80 ML: 9 INJECTION, SOLUTION INTRAVENOUS at 02:04

## 2023-08-20 RX ADMIN — AMPICILLIN SODIUM AND SULBACTAM SODIUM 3 G: 2; 1 INJECTION, POWDER, FOR SOLUTION INTRAMUSCULAR; INTRAVENOUS at 08:53

## 2023-08-20 RX ADMIN — LISINOPRIL 10 MG: 10 TABLET ORAL at 10:12

## 2023-08-20 RX ADMIN — BUPROPION HYDROCHLORIDE 150 MG: 150 TABLET, EXTENDED RELEASE ORAL at 14:21

## 2023-08-20 RX ADMIN — AMPICILLIN SODIUM, SULBACTAM SODIUM 3 G: 2; 1 INJECTION, POWDER, FOR SOLUTION INTRAMUSCULAR; INTRAVENOUS at 03:34

## 2023-08-20 RX ADMIN — OXYCODONE HYDROCHLORIDE 10 MG: 5 TABLET ORAL at 14:22

## 2023-08-20 RX ADMIN — OXYCODONE HYDROCHLORIDE 2.5 MG: 5 TABLET ORAL at 05:40

## 2023-08-20 RX ADMIN — IOPAMIDOL 100 ML: 755 INJECTION, SOLUTION INTRAVENOUS at 02:04

## 2023-08-20 RX ADMIN — OXYCODONE HYDROCHLORIDE 10 MG: 5 TABLET ORAL at 10:11

## 2023-08-20 ASSESSMENT — ACTIVITIES OF DAILY LIVING (ADL)
ADLS_ACUITY_SCORE: 21
ADLS_ACUITY_SCORE: 35
ADLS_ACUITY_SCORE: 21
ADLS_ACUITY_SCORE: 21

## 2023-08-20 NOTE — PLAN OF CARE
"PRIMARY DIAGNOSIS:  Otitis externa, Otitis media, Mastoiditis, Parotitis, Facial cellulites  OUTPATIENT/OBSERVATION GOALS TO BE MET BEFORE DISCHARGE:  ADLs back to baseline: Yes    Activity and level of assistance: Ambulating independently.    Pain status: Improved-controlled with oral pain medications.    Return to near baseline physical activity: Yes     Discharge Planner Nurse   Safe discharge environment identified: Yes  Barriers to discharge: Yes       Entered by: Cami Bran RN 08/20/2023 10:58 AM  A&Ox4, independent. Saline locked, Unasyn  q6h. Pain 9/10  giving PRN Oxycodone 10mg q4h, and IV Toradol. Ear wick in place to help with eardrop administration.  Cardiac diet, tolerating, good appetite.     /79 (BP Location: Left arm)   Pulse 80   Temp 98.5  F (36.9  C) (Oral)   Resp 18   Ht 1.702 m (5' 7\")   Wt 98.1 kg (216 lb 3.2 oz)   SpO2 98%   BMI 33.86 kg/m        "

## 2023-08-20 NOTE — PHARMACY-ADMISSION MEDICATION HISTORY
Pharmacist Admission Medication History    Admission medication history is complete. The information provided in this note is only as accurate as the sources available at the time of the update.    Medication reconciliation/reorder completed by provider prior to medication history? Yes    Information Source(s): Patient via in-person    Pertinent Information: none    Changes made to PTA medication list:  Added: Amoxil 500mg, Ciprodex otic susp, Ibuprofen 200mg, Multivitamins  Deleted: Albuterol inhaler, Atorvastatin, Carvedilol, Cyclobenzaprine 10mg, Oxycodone 5mg   Changed: Venlafaxine 150mg -> 225mg every day    Medication Affordability:  Not including over the counter (OTC) medications, was there a time in the past 3 months when you did not take your medications as prescribed because of cost?: Unable to Assess    Allergies reviewed with patient and updates made in EHR: yes    Medication History Completed By: Sigifredo Elkins RPH 8/20/2023 9:28 AM    Prior to Admission medications    Medication Sig Last Dose Taking? Auth Provider Long Term End Date   amoxicillin (AMOXIL) 500 MG capsule Take 1,000 mg by mouth daily 8/19/2023 Yes Unknown, Entered By History     buPROPion (WELLBUTRIN SR) 150 MG 12 hr tablet Take 150 mg by mouth 2 times daily 8/19/2023 Yes Unknown, Entered By History No    ciprofloxacin-dexAMETHasone (CIPRODEX) 0.3-0.1 % otic suspension 4 drops 4 times daily 8/19/2023 Yes Unknown, Entered By History     ibuprofen (ADVIL/MOTRIN) 200 MG tablet Take 200 mg by mouth as needed for pain  at PRN Yes Unknown, Entered By History     levonorgestrel (MIRENA) 20 MCG/24HR IUD 1 each by Intrauterine route once  Yes Reported, Patient Yes    lisinopril (ZESTRIL) 10 MG tablet Take 10 mg by mouth 8/19/2023 Yes Reported, Patient Yes    Multiple Vitamin (MULTIVITAMIN ADULT PO) Take 1 tablet by mouth daily 8/19/2023 Yes Unknown, Entered By History     venlafaxine (EFFEXOR XR) 75 MG 24 hr capsule Take 3 capsules by mouth  daily 8/19/2023 Yes Reported, Patient Yes    EPINEPHrine (EPIPEN 2-ADIS) 0.3 MG/0.3ML injection Inject 0.3 mLs (0.3 mg) into the muscle once as needed for anaphylaxis (For sudden onset of difficulty breathing and or mouth/throat swelling)  at PRN  Simona Bowie MD

## 2023-08-20 NOTE — PLAN OF CARE
ROOM # 228    Living Situation (if not independent, order SW consult): Lives with daughter  Facility name:  : [facility] ( son)     Activity level at baseline: Independent  Activity level on admit: Independent    Who will be transporting you at discharge: Family    Patient registered to observation; given Patient Bill of Rights; given the opportunity to ask questions about observation status and their plan of care.  Patient has been oriented to the observation room, bathroom and call light is in place.    Discussed discharge goals and expectations with patient/family.

## 2023-08-20 NOTE — H&P
"[facility] [place]    Hospitalist History and Physical    Name: Jenifer Delong    MRN: [id number]  YOB: 1970    Age: 52 year old  Date of Admission:  8/20/2023  Date of Service (when I saw the patient): 08/20/23    Assessment & Plan   Jenifer Delong is a 52 year old female with past medical history significant for hypertension hyperlipidemia depression presented to the emergency room with worsening left ear pain and swelling.  Evaluation in the emergency room including CT shows mastoiditis and lymphadenopathy.  Patient is being admitted for IV antibiotics and ENT consult    Otitis externa  Otitis media  Mastoiditis  Parotitis  Facial cellulitis  -Symptoms started 3 days ago  -On antibiotics for 1 day  -Came to the emergency room with worsening pain and swelling pressure and inability to hear  -CT with evidence of facial cellulitis and mastoiditis:  -Patient admitted for few doses of IV antibiotics can be discharged home on oral Augmentin if clinically improves  -Also noted in CT \"Enlarged nodular density involving the superior aspect of the left parotid gland   measuring up to 1.2 cm is favored to reflect a reactive intraparotid lymph node, however other benign and/or malignant parotid lesions cannot be entirely excluded. Imaging follow-up to resolution is recommended\" given patient's history of breast cancer this will need follow-up  -Consult ENT  -As needed pain meds  -Continue IV antibiotics ordered in the emergency room  -We will order Ciprodex for otitis externa    Hypertension  -Resume prior to admission meds once reconciled    Depression and anxiety  -Resume prior to admission meds once reconciled      DVT Prophylaxis: Pneumatic Compression Devices  Code Status: Full Code    Disposition: Expected discharge in 1-2 days once after  few dose of IV antibiotics and ENT consult.    Primary Care Physician   Debbi Quintero    Chief Complaint   Left ear pressure tinnitus pain swelling 3 " days  Worsening facial swelling decreased hearing 1 day    History is obtained from the patient    History of Present Illness   Jenifer Delong is a 52 year old female who presents  with past medical history significant for hypertension hyperlipidemia depression presented to the emergency room with worsening left ear pain and swelling.    She notes symptoms started 3 days ago with tinnitus ear pressure with worsening earache.  She was seen in urgent care was started on treatment for otitis externa with topical Ciprodex and amoxicillin.  She got worsening symptoms now with increasing swelling of the external ear and tending to facial cellulitis.  Increasing pain and decreased hearing throbbing pain 10 out of 10 in intensity with decreased hearing increased pressure.  No improvement with antibiotics as prescribed initially.  Patient was subsequently prescribed Augmentin and discharged but returns to the emergency room with worsening swelling and extension of inflammation involving her face.    Evaluation in the emergency room including CT shows mastoiditis and lymphadenopathy.  Patient is being admitted observation unit for IV antibiotics and ENT consult    More than 10 point review of systems was carried out was otherwise negative.    Past Medical History    Past Medical History:   Diagnosis Date    Abnormal Pap smear of cervix     normal colposcopy. normal paps since    Breast cancer     Depressive disorder     Hypertension     Migraine          Past Surgical History   Past Surgical History:   Procedure Laterality Date     SECTION      IR LYMPH NODE BIOPSY  2015    LAPAROSCOPY DIAGNOSTIC (GYN)      MASTECTOMY MODIFIED RADICAL Right 2016    OPERATIVE HYSTEROSCOPY  2013    Procedure: OPERATIVE HYSTEROSCOPY;  Hysteroscopic removal of retained Intrauterine device, with placement of Nexplanon Left ;  Surgeon: Dayna Granger MD;  Location: [facility] OR       Prior to Admission Medications    Prior to Admission Medications   Prescriptions Last Dose Informant Patient Reported? Taking?   ATORVASTATIN CALCIUM PO   Yes No   BuPROPion HCl (WELLBUTRIN PO)   Yes No   CARVEDILOL PO   Yes No   EPINEPHrine (EPIPEN 2-ADIS) 0.3 MG/0.3ML injection   No No   Sig: Inject 0.3 mLs (0.3 mg) into the muscle once as needed for anaphylaxis (For sudden onset of difficulty breathing and or mouth/throat swelling)   LISINOPRIL PO   Yes No   Sig: Take 10 mg by mouth   albuterol (PROAIR HFA/PROVENTIL HFA/VENTOLIN HFA) 108 (90 Base) MCG/ACT inhaler   No No   Sig: Inhale 2 puffs into the lungs every 6 hours as needed for shortness of breath / dyspnea or wheezing   Patient not taking: Reported on 2021   cyclobenzaprine (FLEXERIL) 10 MG tablet   No No   Sig: Take 0.5-1 tablets (5-10 mg) by mouth 3 times daily as needed for muscle spasms   Patient not taking: Reported on 2021   levonorgestrel (MIRENA) 20 MCG/24HR IUD   Yes No   Si each by Intrauterine route once   oxyCODONE (ROXICODONE) 5 MG tablet   No No   Sig: Take 1 tablet (5 mg) by mouth every 6 hours as needed for severe pain   venlafaxine (EFFEXOR-XR) 150 MG 24 hr capsule   Yes No      Facility-Administered Medications: None     Allergies   Allergies   Allergen Reactions    Hornet Venom Anaphylaxis    Bee     No Known Drug Allergy        Social History   Social History     Tobacco Use    Smoking status: Never    Smokeless tobacco: Never   Substance Use Topics    Alcohol use: No     Comment: rare     Social History     Social History Narrative    Not on file     Smoking no use of alcohol.    Family History   I have reviewed this patient's family history and updated it with pertinent information if needed.   Family History   Problem Relation Age of Onset    C.A.D. Paternal Grandmother          from MI at age of 66yo    Cancer Father         skin cancer excisions    Cerebrovascular Disease Father     Cancer - colorectal Paternal Grandfather     C.A.D. Paternal  Uncle         s/p CABG x 4    Breast Cancer Paternal Aunt          Review of Systems   A Comprehensive greater than 10 system review of systems was carried out.  Pertinent positives and negatives are noted above.  Otherwise negative for contributory information.    Physical Exam   Temp: 98.9  F (37.2  C) Temp src: Temporal BP: (!) 177/86 Pulse: 89   Resp: 22 SpO2: 99 % O2 Device: None (Room air)    Vital Signs with Ranges  Temp:  [98.9  F (37.2  C)] 98.9  F (37.2  C)  Pulse:  [89] 89  Resp:  [22] 22  BP: (177)/(86) 177/86  SpO2:  [99 %] 99 %  0 lbs 0 oz    GEN:  Alert, oriented x 3, appears comfortable, no overt distress  HEENT:  Normocephalic/atraumatic, no scleral icterus, no nasal discharge, mouth moist.  Erythematous swelling involving external canal left ear swelling pinna around the ear periauricular region.  Tenderness over parotid gland and mastoid.  Minimal facial erythema   CV:  Regular rate and rhythm, no murmur or JVD.  S1 + S2 noted, no S3 or S4.  LUNGS:  Clear to auscultation bilaterally without rales/rhonchi/wheezing/retractions.  Symmetric chest rise on inhalation noted.  ABD:  Active bowel sounds, soft, non-tender/non-distended.  No rebound/guarding/rigidity.  EXT:  No edema.  No cyanosis.  No joint synovitis noted.  SKIN:  Dry to touch, no exanthems noted in the visualized areas.  NEURO:  Symmetric muscle strength, sensation to touch grossly intact.  Coordination symmetric on general exam.  No new focal deficits appreciated.    Data   Data reviewed today:  I personally reviewed the facial CT image(s) showing inflammatory changes around left ear .    Recent Labs   Lab 08/20/23  0155   WBC 9.0   HGB 12.3   HCT 36.7   MCV 86        Recent Labs   Lab 08/20/23  0155      POTASSIUM 4.1   CHLORIDE 102   CO2 23   ANIONGAP 11   *   BUN 12.9   CR 0.78   GFRESTIMATED >90   CECILIA 9.4     7-Day Micro Results       No results found for the last 168 hours.          Recent Labs   Lab  08/20/23  0155   HGB 12.3     No results for input(s): AST, ALT, GGT, ALKPHOS, BILITOTAL, BILICONJ, BILIDIRECT, BIPIN in the last 168 hours.    Invalid input(s): BILIRUBININDIRECT  No results for input(s): INR in the last 168 hours.  Recent Labs   Lab 08/20/23 0155   LACT 1.6     No results for input(s): LIPASE in the last 168 hours.    Recent Results (from the past 24 hour(s))   Soft tissue neck CT w contrast    Narrative    EXAM: CT SOFT TISSUE NECK W CONTRAST  LOCATION: [facility]  DATE: 8/20/2023    INDICATION: Left facial swelling, ear pain.  COMPARISON: None.  CONTRAST: 100 mL Isovue-370.  TECHNIQUE: Routine CT Soft Tissue Neck with IV contrast. Multiplanar reformats. Dose reduction techniques were used.    FINDINGS:     MUCOSAL SPACES/SOFT TISSUES: Normal mucosal spaces of the upper aerodigestive tract. No mucosal mass or inflammation identified. Normal vocal cords and infraglottic trachea. Normal epiglottis. Normal parapharyngeal space. Normal oral cavity,    spaces, and floor of mouth structures.    LYMPH NODES: Increase in number and prominent size left-sided upper cervical chain lymph nodes, favored to be reactive.     SALIVARY GLANDS: Normal appearance of the right parotid gland. There is mild inflammatory change and heterogeneous enhancement involving the superior aspect of the left parotid gland, just below the left ear and left external auditory canal. There are   left preauricular nodular densities which are favored to reflect reactive lymph nodes. There is a prominent nodular hyperdense lesion involving the left parotid gland measuring up to 1.1 x 1.2 cm and this finding is anterior and inferior to the left   ear/external auditory canal and is favored to reflect a reactive lymph node, however, other benign or malignant parotid lesions cannot be entirely excluded.    THYROID: Normal.     VESSELS: Vascular structures of the neck are patent.    VISUALIZED  INTRACRANIAL/ORBITS/SINUSES: No abnormality of the visualized intracranial compartment or orbits. There is a left middle ear effusion. Additionally, there is a partial left mastoid air cell effusion. No definite osseous erosion of the mastoid   septae is noted. No definite adjacent dural venous sinus thrombosis is noted within the limited confines of this exam. No definite acute intracranial abnormality noted. No significant retromastoid soft tissue swelling is present. There is soft tissue   thickening noted along the left proximal external auditory canal as well as the left ear with associated increased enhancement.    OTHER: No destructive osseous lesion. The included lung apices are clear.      Impression    IMPRESSION:   1.  Soft tissue thickening and enhancement along the proximal left external auditory canal, likely reflecting acute otitis externa, with extension into the left ear where there is associated presumed soft tissue cellulitis.  2.  Mild increased inflammatory change involving the superior aspect of the left parotid gland, favored to reflect reactive versus acute infectious/inflammatory parotitis. Enlarged nodular density involving the superior aspect of the left parotid gland   measuring up to 1.2 cm is favored to reflect a reactive intraparotid lymph node, however other benign and/or malignant parotid lesions cannot be entirely excluded. Imaging follow-up to resolution is recommended.  3.  Left middle ear and mastoid effusions, likely reflecting acute otitis media/otomastoiditis.  4.  Additional findings above.

## 2023-08-20 NOTE — PLAN OF CARE
PRIMARY DIAGNOSIS: ACUTE PAIN/ Mastoiditis  OUTPATIENT/OBSERVATION GOALS TO BE MET BEFORE DISCHARGE:  1. Pain Status: Improved-controlled with oral pain medications.    2. Return to near baseline physical activity: Yes    3. Cleared for discharge by consultants (if involved): No    Discharge Planner Nurse   Safe discharge environment identified: Yes  Barriers to discharge: Yes       Entered by: Bijal Nagel RN 08/20/2023 5:47 AM     Please review provider order for any additional goals.   Nurse to notify provider when observation goals have been met and patient is ready for discharge.    Pt alert and oriented x 4. Complained of left ear pain PRN Oxycodone given. Ambulating independently in the room, anti slip socks on. Limb alert on Rt arm due to previous mastectomy. Lt side of the face swollen and painful to touch. Plan ENT consult and continue IV antibiotics.

## 2023-08-20 NOTE — ED TRIAGE NOTES
Ear pain left side started Thursday.  Seen at  and started abx and steroids Fri.  Now increased pain into left jaw with left sided facial swelling and hearing loss.      Triage Assessment       Row Name 08/20/23 0058       Triage Assessment (Adult)    Airway WDL WDL       Respiratory WDL    Respiratory WDL WDL       Skin Circulation/Temperature WDL    Skin Circulation/Temperature WDL WDL       Cardiac WDL    Cardiac WDL WDL       Peripheral/Neurovascular WDL    Peripheral Neurovascular WDL WDL       Cognitive/Neuro/Behavioral WDL    Cognitive/Neuro/Behavioral WDL WDL

## 2023-08-20 NOTE — ED PROVIDER NOTES
"  History     Chief Complaint:  Facial Swelling       The history is provided by the patient.      Jenifer Delong is a 52 year old female who presents with facial swelling. Patient reports that she started having \"dull\" ear ache on Thursday. She went to an Urgent care on  and was diagnosed with otitis externa of left ear, given ciprodex, amoxicillin and ultram.  On , she returned to urgent care with worsening pain. Unable to visualize TM per urgent care note in documentation though adjusted antibiotics to augmentin as well as ofloxacin otic drop and given prescription for oral prednisone. Patient has yet to take new antibiotic augmentin. Patient has used Tylenol, Motrin and muscle rub with no improvement. She presents predominately as she cannot tolerate the pain any longer and is noticing mild facial swelling on the left.  Patient denies fever, cough, chills, or sore throat. Denies dental pain.     Independent Historian:   None - Patient Only    Review of External Notes:   I reviewed her UC note on      Medications:   Albuterol  Bupropion  Carvedilol  Cyclobenzaprine  Epinephrine  Levonorgestrel  Lisinopril  Oxycodone  Venlafaxine     Past Medical History:    Breast cancer  Depressive disorder  Hypertension  Abnormal pap smear of cervix       Past Surgical History:     section  IR lymph node biopsy  Laparoscopy diagnostic  Mastectomy  Hysteroscopy        Physical Exam   Patient Vitals for the past 24 hrs:   BP Temp Temp src Pulse Resp SpO2   23 0100 (!) 177/86 98.9  F (37.2  C) Temporal 89 22 99 %        Physical Exam  Nursing note and vitals reviewed.  Constitutional: Well nourished. Appears uncomfortable  Eyes: Conjunctiva normal.  Pupils are equal, round, and reactive to light.   ENT: Nose normal. Mucous membranes pink and moist.  L. Pinna/tragus tender to palpation; middle ear canal edematous; L. TM erythema, R. TM normal. No mastoid tenderness; slight L. Facial swelling near parotid " region. No posterior oropharyngeal erythema/exudate. Uvula midline. No trismus or abscess along gumline.   Neck: Normal range of motion.  CVS: Normal rate, regular rhythm.  Normal heart sounds.    Pulmonary: Lungs clear to auscultation bilaterally. No wheezes/rales/rhonchi.  GI: Abdomen soft. Nontender, nondistended. No rigidity or guarding.    MSK: Moves all extremities  Neuro: Alert. Follows simple commands.  Skin: Skin is warm and dry. No rash noted.   Psychiatric: Normal affect.       Emergency Department Course   Imaging:  Soft tissue neck CT w contrast   Preliminary Result   IMPRESSION:    1.  Soft tissue thickening and enhancement along the proximal left external auditory canal, likely reflecting acute otitis externa, with extension into the left ear where there is associated presumed soft tissue cellulitis.   2.  Mild increased inflammatory change involving the superior aspect of the left parotid gland, favored to reflect reactive versus acute infectious/inflammatory parotitis. Enlarged nodular density involving the superior aspect of the left parotid gland    measuring up to 1.2 cm is favored to reflect a reactive intraparotid lymph node, however other benign and/or malignant parotid lesions cannot be entirely excluded. Imaging follow-up to resolution is recommended.   3.  Left middle ear and mastoid effusions, likely reflecting acute otitis media/otomastoiditis.   4.  Additional findings above.         Report per radiology    Laboratory:  Labs Ordered and Resulted from Time of ED Arrival to Time of ED Departure   BASIC METABOLIC PANEL - Abnormal       Result Value    Sodium 136      Potassium 4.1      Chloride 102      Carbon Dioxide (CO2) 23      Anion Gap 11      Urea Nitrogen 12.9      Creatinine 0.78      Calcium 9.4      Glucose 139 (*)     GFR Estimate >90     LACTIC ACID WHOLE BLOOD - Normal    Lactic Acid 1.6     CBC WITH PLATELETS AND DIFFERENTIAL    WBC Count 9.0      RBC Count 4.25       Hemoglobin 12.3      Hematocrit 36.7      MCV 86      MCH 28.9      MCHC 33.5      RDW 13.3      Platelet Count 323      % Neutrophils 62      % Lymphocytes 26      % Monocytes 7      % Eosinophils 3      % Basophils 1      % Immature Granulocytes 1      NRBCs per 100 WBC 0      Absolute Neutrophils 5.7      Absolute Lymphocytes 2.3      Absolute Monocytes 0.6      Absolute Eosinophils 0.3      Absolute Basophils 0.1      Absolute Immature Granulocytes 0.1      Absolute NRBCs 0.0            Emergency Department Course & Assessments:  Interventions:  Medications   ketorolac (TORADOL) injection 15 mg (has no administration in time range)   oxyCODONE (ROXICODONE) tablet 5 mg (has no administration in time range)        Assessments/Consultations/Discussion of Management or Tests:  ED Course as of 08/20/23 0337   Sun Aug 20, 2023   0124 I obtained history and examined the patient as noted above.    0327 I consulted and talked with hospitalist, Dr. Mendoza who accepts the patient for admission.      Procedure  Ear wick placement  Indications: L. Otitis externa    An ear wick was gently placed in the middle auditory canal without complication.     Social Determinants of Health affecting care:   None    Consults  3:34 AM I spoke to Dr. Mendoza    Disposition:  The patient was accepted by Dr. Mendoza    Impression & Plan    CMS Diagnoses: None    Medical Decision Making:    Patient is a 52-year-old female presenting with predominately complaints of left ear pain as well as facial swelling.  She is currently on outpatient antibiotics for management of otitis media as well as otitis externa.  She is nontoxic appearing though appears quite uncomfortable on arrival.  Labs overall reassuring without evidence to suggest underlying sepsis.  Decision was made to pursue formal CT which suggest findings concerning for otitis externa as well as otitis media in addition to developing facial cellulitis.  Notable inflammatory changes  around the parotid gland as well as a nodular density in the left parotid gland which may be a reactive lymph node though malignancy cannot be excluded. Otomastoiditis cannot be excluded as well.  Given patient has been on outpatient antibiotics with worsening facial cellulitis and pain with concern for possible developing mastoiditis, I do feel she would benefit from observation at this point in time with ENT consultation.  She was given a dose of IV Unasyn and remained hemodynamically stable.  An ear wick was placed as well.    Diagnosis:    ICD-10-CM    1. Acute otitis externa of left ear, unspecified type  H60.502       2. Facial cellulitis  L03.211       3. Acute otitis media, unspecified otitis media type  H66.90       4. Abnormal CT scan  R93.89     L. parotid lesion           Discharge Medications:  New Prescriptions    No medications on file          Scribe Disclosure:  Nicole AMIN, am serving as a scribe at 1:35 AM on 8/20/2023 to document services personally performed by Estephania Brandt DO based on my observations and the provider's statements to me.   8/20/2023   Estephania Brandt DO McDonald, Lindsey E, DO  08/20/23 0338

## 2023-08-20 NOTE — PLAN OF CARE
"PRIMARY DIAGNOSIS:  Otitis externa, Otitis media, Mastoiditis, Parotitis, Facial cellulites  OUTPATIENT/OBSERVATION GOALS TO BE MET BEFORE DISCHARGE:  ADLs back to baseline: Yes    Activity and level of assistance: Ambulating independently.    Pain status: Improved-controlled with oral pain medications.    Return to near baseline physical activity: Yes     Discharge Planner Nurse   Safe discharge environment identified: Yes  Barriers to discharge: Yes       Entered by: Cami Bran RN 08/20/2023 12:09 PM  A&Ox4, independent. Saline locked, Unasyn  q6h. Pain 9/10  giving PRN Oxycodone 10mg q4h, and IV Toradol q6h, which has dropped pain to 3/10. Ear wick in place to help with eardrop administration.  Cardiac diet, tolerating, good appetite.     /79 (BP Location: Left arm)   Pulse 80   Temp 98.5  F (36.9  C) (Oral)   Resp 18   Ht 1.702 m (5' 7\")   Wt 98.1 kg (216 lb 3.2 oz)   SpO2 98%   BMI 33.86 kg/m        "

## 2023-08-20 NOTE — DISCHARGE SUMMARY
[facility]  Discharge Summary        Jenifer Delong MRN# [id number]   YOB: 1970 Age: 52 year old     Date of Admission:  8/20/2023  Date of Discharge:  8/20/2023  Admitting Physician:  Qi Mendoza MD  Discharge Physician: Nathaly Amin PA-C  Discharging Service: Hospitalist     Primary Provider: Debbi Quintero  Primary Care Physician Phone Number: [phone]         Discharge Diagnoses/Problem Oriented Hospital Course (Providers):      Jenifer Delong was admitted on 8/20/2023 by Qi Mendoza MD and I would refer you to their history and physical.  The following problems were addressed during her hospitalization:    Acute left otitis media/externa  Acute left mastoiditis/parotiditis  Mild left facial cellulitis due to above, improved         Code Status:      Full Code        Brief Hospital Stay Summary Sent Home With Patient in AVS:          Reason for your hospital stay      You were admitted for worsening left ear and facial pain. Your scan shows   infection of the middle and externa ear as well as inflammation of the   parotid and mastoid bone on that side of the face. You need to continue   and finish all of your antibiotics that you were given previously, also   you have a wick in your ear and place 4 drops to the left ear twice a day   for 7 more days. You will take tylenol and ibuprofen for pain mostly and   take as needed oxycodone for breakthrough pain. Follow up with ENT in 1   week.        Jenifer Delong is a 52 year old female with past medical history significant for hypertension hyperlipidemia depression presented to the emergency room with worsening left ear pain and swelling.  Evaluation in the emergency room including CT shows mastoiditis, parotiditis along with otitis media and externa with associated lymphadenopathy.  Patient is being admitted for IV antibiotics and pain control.      Otitis externa  Otitis media  Mastoiditis  Parotitis  Facial  "cellulitis  -Symptoms started 3 days ago  -On oral antibiotics for 1 day  -Came to the emergency room with worsening pain and swelling pressure and inability to hear  -CT with evidence of facial cellulitis and mastoiditis, parotiditis along with otitis media and externa  -Patient admitted for IV antibiotics Unasyn  -We will order Ciprodex for otitis externa  -Pain poorly controlled this am, added Toradol and increased oxycodone   - Discussed with ENT, no procedure needed, recommend cont abx, pain control and abx drop and can follow up with ENT as outpt later this week   - Note CT findings of \"nlarged nodular density involving the superior aspect of the left parotid gland   measuring up to 1.2 cm is favored to reflect a reactive intraparotid lymph node, however other benign and/or malignant parotid lesions cannot be entirely excluded\"  - Will follow up with ENT to ensure resolution   - Pt pain was better controlled, comfortable with going home with oral abx and ear gtt         Important Results:        Recent Labs   Lab 08/20/23  0155   WBC 9.0   HGB 12.3   HCT 36.7   MCV 86        Recent Labs   Lab 08/20/23  0155      POTASSIUM 4.1   CHLORIDE 102   CO2 23   ANIONGAP 11   *   BUN 12.9   CR 0.78   GFRESTIMATED >90   CECILIA 9.4            Pending Results:        Unresulted Labs Ordered in the Past 30 Days of this Admission       No orders found from 7/21/2023 to 8/21/2023.              Discharge Instructions and Follow-Up:      Follow-up Appointments     Follow-up and recommended labs and tests       Follow up with ENT in 1 week. Will need eventual repeat scan of parotid   to ensure resolution of nodule.              Discharge Disposition:        Discharged to home         Discharge Medications:        Current Discharge Medication List        START taking these medications    Details   oxyCODONE (ROXICODONE) 5 MG tablet Take 1-2 tablets (5-10 mg) by mouth every 4 hours as needed for moderate pain or " severe pain (IF pain not managed with non-pharmacological and non-opioid interventions)  Qty: 10 tablet, Refills: 0    Associated Diagnoses: Acute otitis media, unspecified otitis media type           CONTINUE these medications which have CHANGED    Details   ciprofloxacin-dexAMETHasone (CIPRODEX) 0.3-0.1 % otic suspension Place 4 drops Into the left ear 2 times daily  Qty: 3.2 mL, Refills: 0      ibuprofen (ADVIL/MOTRIN) 200 MG tablet Take 3 tablets (600 mg) by mouth 3 times daily for 5 days  Qty: 45 tablet, Refills: 0    Associated Diagnoses: Acute otitis media, unspecified otitis media type           CONTINUE these medications which have NOT CHANGED    Details   amoxicillin-clavulanate (AUGMENTIN) 875-125 MG tablet Take 1 tablet by mouth 2 times daily      buPROPion (WELLBUTRIN SR) 150 MG 12 hr tablet Take 150 mg by mouth 2 times daily      levonorgestrel (MIRENA) 20 MCG/24HR IUD 1 each by Intrauterine route once      lisinopril (ZESTRIL) 10 MG tablet Take 10 mg by mouth      Multiple Vitamin (MULTIVITAMIN ADULT PO) Take 1 tablet by mouth daily      venlafaxine (EFFEXOR XR) 75 MG 24 hr capsule Take 3 capsules by mouth daily      EPINEPHrine (EPIPEN 2-ADIS) 0.3 MG/0.3ML injection Inject 0.3 mLs (0.3 mg) into the muscle once as needed for anaphylaxis (For sudden onset of difficulty breathing and or mouth/throat swelling)  Qty: 1 each, Refills: 0           STOP taking these medications       albuterol (PROAIR HFA/PROVENTIL HFA/VENTOLIN HFA) 108 (90 Base) MCG/ACT inhaler Comments:   Reason for Stopping:         amoxicillin (AMOXIL) 500 MG capsule Comments:   Reason for Stopping:                 Allergies:         Allergies   Allergen Reactions    Hornet Venom Anaphylaxis    Bee     No Known Drug Allergy            Consultations This Hospital Stay:        No consultations were requested during this admission         Condition and Physical on Discharge:        Discharge condition: Stable   Vitals: Blood pressure 120/79,  "pulse 76, temperature 98.7  F (37.1  C), temperature source Oral, resp. rate 19, height 1.702 m (5' 7\"), weight 98.1 kg (216 lb 3.2 oz), SpO2 97 %, not currently breastfeeding.  216 lbs 3.2 oz      Constitutional: Awake, alert, cooperative, no apparent distress      Respiratory: Clear to auscultation bilaterally, no crackles or wheezing   Cardiovascular: Regular rate and rhythm, normal S1 and S2, and no murmur noted   Abdomen: Normal bowel sounds, soft, non-distended, non-tender   Skin: Erythematous swelling involving external canal left ear swelling pinna around the ear periauricular region. Pain with palpation of the external year, Tenderness over parotid gland and mastoid.  Minimal facial erythema or facial swelling   Neuro: Alert and oriented x3, no weakness, numbness, memory loss   Extremities: No edema, normal range of motion   Other(s):           All other systems: Negative         Discharge Time:      <30 mins         Image Results From This Hospital Stay (For Non-EPIC Providers):        Results for orders placed or performed during the hospital encounter of 08/20/23   Soft tissue neck CT w contrast    Narrative    EXAM: CT SOFT TISSUE NECK W CONTRAST  LOCATION: [facility]  DATE: 8/20/2023    INDICATION: Left facial swelling, ear pain.  COMPARISON: None.  CONTRAST: 100 mL Isovue-370.  TECHNIQUE: Routine CT Soft Tissue Neck with IV contrast. Multiplanar reformats. Dose reduction techniques were used.    FINDINGS:     MUCOSAL SPACES/SOFT TISSUES: Normal mucosal spaces of the upper aerodigestive tract. No mucosal mass or inflammation identified. Normal vocal cords and infraglottic trachea. Normal epiglottis. Normal parapharyngeal space. Normal oral cavity,    spaces, and floor of mouth structures.    LYMPH NODES: Increased in number and prominent size left-sided upper cervical chain lymph nodes, favored to be reactive.     SALIVARY GLANDS: Normal appearance of the right parotid " gland. There is mild inflammatory change and heterogeneous enhancement involving the superior aspect of the left parotid gland, just below the left ear and left external auditory canal. There are   left preauricular nodular densities which are favored to reflect reactive lymph nodes. There is a prominent and well-circumscribed hyperdense nodule involving the left parotid gland measuring up to 1.1 x 1.2 cm. This finding is anterior and inferior to   the left ear/external auditory canal and is favored to reflect a reactive lymph node, however, other benign or malignant parotid lesions cannot be entirely excluded.    THYROID: Normal.     VESSELS: Vascular structures of the neck are patent.    VISUALIZED INTRACRANIAL/ORBITS/SINUSES: No abnormality of the visualized intracranial compartment or orbits. There is a left middle ear effusion. Additionally, there is a partial left mastoid air cell effusion. No definite osseous erosion of the mastoid   septae is noted. No definite adjacent dural venous sinus thrombosis is noted within the limited confines of this exam. No definite acute intracranial abnormality noted. No significant retromastoid soft tissue swelling is present. There is soft tissue   thickening noted along the left proximal external auditory canal as well as the left ear with associated increased enhancement.    OTHER: No destructive osseous lesion. The included lung apices are clear.      Impression    IMPRESSION:   1.  Soft tissue thickening and enhancement along the proximal left external auditory canal, likely reflecting acute otitis externa with extension into the left ear where there is associated/presumed soft tissue cellulitis.  2.  Mild increased inflammatory change involving the superior aspect of the left parotid gland, favored to reflect reactive versus acute infectious/inflammatory parotitis. Enlarged nodular density involving the superior aspect of the left parotid gland   measuring up to 1.2 cm  is favored to reflect a reactive intraparotid lymph node, however other benign and/or malignant parotid lesions cannot be entirely excluded. Imaging follow-up to resolution is recommended.  3.  Left middle ear and mastoid effusions, likely reflecting acute otitis media/otomastoiditis.  4.  Additional findings above.

## 2023-08-20 NOTE — PROGRESS NOTES
"[facility]  Hospitalist Progress Note  Nathaly Amin PA-C 08/20/2023         Assessment and Plan:        Jenifer Delong is a 52 year old female with past medical history significant for hypertension hyperlipidemia depression presented to the emergency room with worsening left ear pain and swelling.  Evaluation in the emergency room including CT shows mastoiditis, parotiditis along with otitis media and externa with associated lymphadenopathy.  Patient is being admitted for IV antibiotics and pain control.      Otitis externa  Otitis media  Mastoiditis  Parotitis  Facial cellulitis  -Symptoms started 3 days ago  -On oral antibiotics for 1 day  -Came to the emergency room with worsening pain and swelling pressure and inability to hear  -CT with evidence of facial cellulitis and mastoiditis, parotiditis along with otitis media and externa  -Patient admitted for IV antibiotics Unasyn  -We will order Ciprodex for otitis externa  -Pain poorly controlled this am, added Toradol and increased oxycodone   - Discussed with ENT, no procedure needed, recommend cont abx, pain control and abx drop and can follow up with ENT as outpt later this week   - Note CT findings of \"nlarged nodular density involving the superior aspect of the left parotid gland   measuring up to 1.2 cm is favored to reflect a reactive intraparotid lymph node, however other benign and/or malignant parotid lesions cannot be entirely excluded\"  - Will follow up with ENT to ensure resolution     Hypertension  -stable, resume lisinopril     Depression and anxiety  -Resume Effexor      DVT Prophylaxis: Pneumatic Compression Devices  Code Status: Full Code     Disposition: Expected discharge later today or tomorrow once pain has improved   Plan for outpt follow up.         Interval History (Subjective):      C/o pain, not better since admission.   Requesting pain meds                   Physical Exam:      Last Vital Signs:  /79 (BP " "Location: Left arm)   Pulse 76   Temp 98.7  F (37.1  C) (Oral)   Resp 19   Ht 1.702 m (5' 7\")   Wt 98.1 kg (216 lb 3.2 oz)   SpO2 97%   BMI 33.86 kg/m      Constitutional: Awake, alert, cooperative, no apparent distress     Respiratory: Clear to auscultation bilaterally, no crackles or wheezing   Cardiovascular: Regular rate and rhythm, normal S1 and S2, and no murmur noted   Abdomen: Normal bowel sounds, soft, non-distended, non-tender   Skin: Erythematous swelling involving external canal left ear swelling pinna around the ear periauricular region. Pain with palpation of the external year, Tenderness over parotid gland and mastoid.  Minimal facial erythema or facial swelling   Neuro: Alert and oriented x3, no weakness, numbness, memory loss   Extremities: No edema, normal range of motion   Other(s):        All other systems: Negative          Medications:      All current medications were reviewed with changes reflected in problem list.         Data:      All new lab and imaging data was reviewed.   Labs:       Lab Results   Component Value Date     08/20/2023     03/22/2023     05/13/2021     08/01/2020     04/14/2020    Lab Results   Component Value Date    CHLORIDE 102 08/20/2023    CHLORIDE 102 03/22/2023    CHLORIDE 109 05/13/2021    CHLORIDE 108 08/01/2020    CHLORIDE 107 04/14/2020    Lab Results   Component Value Date    BUN 12.9 08/20/2023    BUN 17.1 03/22/2023    BUN 19 05/13/2021    BUN 17 08/01/2020    BUN 21 04/14/2020      Lab Results   Component Value Date    POTASSIUM 4.1 08/20/2023    POTASSIUM 3.7 03/22/2023    POTASSIUM 3.9 05/13/2021    POTASSIUM 4.0 08/01/2020    POTASSIUM 3.9 04/14/2020    Lab Results   Component Value Date    CO2 23 08/20/2023    CO2 23 03/22/2023    CO2 27 05/13/2021    CO2 28 08/01/2020    CO2 25 04/14/2020    Lab Results   Component Value Date    CR 0.78 08/20/2023    CR 0.84 03/22/2023    CR 0.85 05/13/2021    CR 0.88 08/01/2020    " CR 0.76 04/14/2020        Recent Labs   Lab 08/20/23  0155   WBC 9.0   HGB 12.3   HCT 36.7   MCV 86         Imaging:   Results for orders placed or performed during the hospital encounter of 08/20/23   Soft tissue neck CT w contrast    Narrative    EXAM: CT SOFT TISSUE NECK W CONTRAST  LOCATION: [facility]  DATE: 8/20/2023    INDICATION: Left facial swelling, ear pain.  COMPARISON: None.  CONTRAST: 100 mL Isovue-370.  TECHNIQUE: Routine CT Soft Tissue Neck with IV contrast. Multiplanar reformats. Dose reduction techniques were used.    FINDINGS:     MUCOSAL SPACES/SOFT TISSUES: Normal mucosal spaces of the upper aerodigestive tract. No mucosal mass or inflammation identified. Normal vocal cords and infraglottic trachea. Normal epiglottis. Normal parapharyngeal space. Normal oral cavity,    spaces, and floor of mouth structures.    LYMPH NODES: Increased in number and prominent size left-sided upper cervical chain lymph nodes, favored to be reactive.     SALIVARY GLANDS: Normal appearance of the right parotid gland. There is mild inflammatory change and heterogeneous enhancement involving the superior aspect of the left parotid gland, just below the left ear and left external auditory canal. There are   left preauricular nodular densities which are favored to reflect reactive lymph nodes. There is a prominent and well-circumscribed hyperdense nodule involving the left parotid gland measuring up to 1.1 x 1.2 cm. This finding is anterior and inferior to   the left ear/external auditory canal and is favored to reflect a reactive lymph node, however, other benign or malignant parotid lesions cannot be entirely excluded.    THYROID: Normal.     VESSELS: Vascular structures of the neck are patent.    VISUALIZED INTRACRANIAL/ORBITS/SINUSES: No abnormality of the visualized intracranial compartment or orbits. There is a left middle ear effusion. Additionally, there is a partial left  mastoid air cell effusion. No definite osseous erosion of the mastoid   septae is noted. No definite adjacent dural venous sinus thrombosis is noted within the limited confines of this exam. No definite acute intracranial abnormality noted. No significant retromastoid soft tissue swelling is present. There is soft tissue   thickening noted along the left proximal external auditory canal as well as the left ear with associated increased enhancement.    OTHER: No destructive osseous lesion. The included lung apices are clear.      Impression    IMPRESSION:   1.  Soft tissue thickening and enhancement along the proximal left external auditory canal, likely reflecting acute otitis externa with extension into the left ear where there is associated/presumed soft tissue cellulitis.  2.  Mild increased inflammatory change involving the superior aspect of the left parotid gland, favored to reflect reactive versus acute infectious/inflammatory parotitis. Enlarged nodular density involving the superior aspect of the left parotid gland   measuring up to 1.2 cm is favored to reflect a reactive intraparotid lymph node, however other benign and/or malignant parotid lesions cannot be entirely excluded. Imaging follow-up to resolution is recommended.  3.  Left middle ear and mastoid effusions, likely reflecting acute otitis media/otomastoiditis.  4.  Additional findings above.

## 2023-08-20 NOTE — ED NOTES
[facility]  ED Nurse Handoff Report    ED Chief complaint: Facial Swelling  . ED Diagnosis:   Final diagnoses:   Acute otitis externa of left ear, unspecified type   Facial cellulitis   Acute otitis media, unspecified otitis media type   Abnormal CT scan - L. parotid lesion       Allergies:   Allergies   Allergen Reactions    Hornet Venom Anaphylaxis    Bee     No Known Drug Allergy        Code Status: Full Code    Activity level - Baseline/Home:  independent.  Activity Level - Current:   independent.   Lift room needed: No.   Bariatric: No   Needed: No   Isolation: No.   Infection: Not Applicable.     Respiratory status: Room air    Vital Signs (within 30 minutes):   Vitals:    08/20/23 0100   BP: (!) 177/86   Pulse: 89   Resp: 22   Temp: 98.9  F (37.2  C)   TempSrc: Temporal   SpO2: 99%       Cardiac Rhythm:  ,      Pain level:    Patient confused: No.   Patient Falls Risk: nonskid shoes/slippers when out of bed.   Elimination Status: Has voided     Patient Report - Initial Complaint: Facial swelling.   Focused Assessment: lexi is a 52-year-old female presenting with predominately complaints of left ear pain as well as facial swelling.  She is currently on outpatient antibiotics for management of otitis media as well as otitis externa.  She is nontoxic appearing though appears quite uncomfortable on arrival.  Labs overall reassuring without evidence to suggest underlying sepsis.  Decision was made to pursue formal CT which suggest findings concerning for otitis externa as well as otitis media in addition to developing facial cellulitis.  Notable inflammatory changes around the parotid gland as well as a nodular density in the left parotid gland which may be a reactive lymph node though malignancy cannot be excluded. Otomastoiditis cannot be excluded as well.  Given patient has been on outpatient antibiotics with worsening facial cellulitis and pain with concern for possible  developing mastoiditis, I do feel she would benefit from observation at this point in time with ENT consultation.  She was given a dose of IV Unasyn and remained hemodynamically stable.  An ear wick was placed as well.        Abnormal Results:   Labs Ordered and Resulted from Time of ED Arrival to Time of ED Departure   BASIC METABOLIC PANEL - Abnormal       Result Value    Sodium 136      Potassium 4.1      Chloride 102      Carbon Dioxide (CO2) 23      Anion Gap 11      Urea Nitrogen 12.9      Creatinine 0.78      Calcium 9.4      Glucose 139 (*)     GFR Estimate >90     LACTIC ACID WHOLE BLOOD - Normal    Lactic Acid 1.6     CBC WITH PLATELETS AND DIFFERENTIAL    WBC Count 9.0      RBC Count 4.25      Hemoglobin 12.3      Hematocrit 36.7      MCV 86      MCH 28.9      MCHC 33.5      RDW 13.3      Platelet Count 323      % Neutrophils 62      % Lymphocytes 26      % Monocytes 7      % Eosinophils 3      % Basophils 1      % Immature Granulocytes 1      NRBCs per 100 WBC 0      Absolute Neutrophils 5.7      Absolute Lymphocytes 2.3      Absolute Monocytes 0.6      Absolute Eosinophils 0.3      Absolute Basophils 0.1      Absolute Immature Granulocytes 0.1      Absolute NRBCs 0.0          Soft tissue neck CT w contrast   Preliminary Result   IMPRESSION:    1.  Soft tissue thickening and enhancement along the proximal left external auditory canal, likely reflecting acute otitis externa, with extension into the left ear where there is associated presumed soft tissue cellulitis.   2.  Mild increased inflammatory change involving the superior aspect of the left parotid gland, favored to reflect reactive versus acute infectious/inflammatory parotitis. Enlarged nodular density involving the superior aspect of the left parotid gland    measuring up to 1.2 cm is favored to reflect a reactive intraparotid lymph node, however other benign and/or malignant parotid lesions cannot be entirely excluded. Imaging follow-up to  resolution is recommended.   3.  Left middle ear and mastoid effusions, likely reflecting acute otitis media/otomastoiditis.   4.  Additional findings above.          Treatments provided: abx, pain meds (see MAR)  Family Comments: n/a  OBS brochure/video discussed/provided to patient:  Yes  ED Medications:   Medications   ampicillin-sulbactam (UNASYN) 3 g vial to attach to  mL bag (3 g Intravenous $New Bag 8/20/23 0334)   HYDROmorphone (PF) (DILAUDID) injection 0.5 mg (has no administration in time range)   ketorolac (TORADOL) injection 15 mg (15 mg Intravenous $Given 8/20/23 0156)   oxyCODONE (ROXICODONE) tablet 5 mg (5 mg Oral $Given 8/20/23 0140)   CT Scan Flush (80 mLs Intravenous $Given 8/20/23 0204)   iopamidol (ISOVUE-370) solution 500 mL (100 mLs Intravenous $Given 8/20/23 0204)       Drips infusing:  No  For the majority of the shift this patient was Green.   Interventions performed were n/a.    Sepsis treatment initiated: No    Cares/treatment/interventions/medications to be completed following ED care: ABX    ED Nurse Name: Isaías Heart RN  3:42 AM  RECEIVING UNIT ED HANDOFF REVIEW    Above ED Nurse Handoff Report was reviewed: Yes  Reviewed by: Bijal Nagel RN on August 20, 2023 at 4:49 AM

## 2023-08-20 NOTE — PROGRESS NOTES
Patient's After Visit Summary was reviewed with patient.  Patient verbalized understanding of After Visit Summary, recommended follow up and was given an opportunity to ask questions.   Discharge medications sent home with patient/family: YES   Discharged with son.

## 2023-09-23 ENCOUNTER — HOSPITAL ENCOUNTER (EMERGENCY)
Facility: CLINIC | Age: 53
Discharge: HOME | End: 2023-09-23
Attending: EMERGENCY MEDICINE | Admitting: EMERGENCY MEDICINE
Payer: COMMERCIAL

## 2023-09-23 ENCOUNTER — APPOINTMENT (OUTPATIENT)
Dept: GENERAL RADIOLOGY | Facility: CLINIC | Age: 53
End: 2023-09-23
Attending: EMERGENCY MEDICINE
Payer: COMMERCIAL

## 2023-09-23 VITALS
RESPIRATION RATE: 18 BRPM | OXYGEN SATURATION: 99 % | HEART RATE: 89 BPM | TEMPERATURE: 97 F | DIASTOLIC BLOOD PRESSURE: 74 MMHG | SYSTOLIC BLOOD PRESSURE: 124 MMHG

## 2023-09-23 DIAGNOSIS — J06.9 UPPER RESPIRATORY TRACT INFECTION, UNSPECIFIED TYPE: ICD-10-CM

## 2023-09-23 DIAGNOSIS — H66.91 ACUTE RIGHT OTITIS MEDIA: ICD-10-CM

## 2023-09-23 DIAGNOSIS — K11.21 PAROTITIS, ACUTE: ICD-10-CM

## 2023-09-23 LAB
FLUAV RNA SPEC QL NAA+PROBE: NEGATIVE
FLUBV RNA RESP QL NAA+PROBE: NEGATIVE
RSV RNA SPEC NAA+PROBE: NEGATIVE
SARS-COV-2 RNA RESP QL NAA+PROBE: NEGATIVE

## 2023-09-23 PROCEDURE — 99284 EMERGENCY DEPT VISIT MOD MDM: CPT | Mod: 25

## 2023-09-23 PROCEDURE — 87637 SARSCOV2&INF A&B&RSV AMP PRB: CPT | Performed by: EMERGENCY MEDICINE

## 2023-09-23 PROCEDURE — 71046 X-RAY EXAM CHEST 2 VIEWS: CPT

## 2023-09-23 PROCEDURE — 250N000013 HC RX MED GY IP 250 OP 250 PS 637: Performed by: EMERGENCY MEDICINE

## 2023-09-23 RX ORDER — OXYCODONE HYDROCHLORIDE 5 MG/1
5 TABLET ORAL ONCE
Status: COMPLETED | OUTPATIENT
Start: 2023-09-23 | End: 2023-09-23

## 2023-09-23 RX ORDER — OXYCODONE HYDROCHLORIDE 5 MG/1
5 TABLET ORAL EVERY 6 HOURS PRN
Qty: 10 TABLET | Refills: 0 | Status: SHIPPED | OUTPATIENT
Start: 2023-09-23 | End: 2023-11-24

## 2023-09-23 RX ORDER — CIPROFLOXACIN AND DEXAMETHASONE 3; 1 MG/ML; MG/ML
4 SUSPENSION/ DROPS AURICULAR (OTIC) 2 TIMES DAILY
Qty: 7.5 ML | Refills: 0 | Status: SHIPPED | OUTPATIENT
Start: 2023-09-23 | End: 2023-10-03

## 2023-09-23 RX ADMIN — AMOXICILLIN AND CLAVULANATE POTASSIUM 1 TABLET: 875; 125 TABLET, FILM COATED ORAL at 19:26

## 2023-09-23 RX ADMIN — OXYCODONE HYDROCHLORIDE 5 MG: 5 TABLET ORAL at 19:26

## 2023-09-23 ASSESSMENT — ACTIVITIES OF DAILY LIVING (ADL)
ADLS_ACUITY_SCORE: 33
ADLS_ACUITY_SCORE: 35

## 2023-09-23 NOTE — ED TRIAGE NOTES
Pt arrives with c/o 2 days of right ear pain which is now causing a headache. Pt also endorses cough and congestion for 2 days.      Triage Assessment       Row Name 09/23/23 9533       Triage Assessment (Adult)    Airway WDL WDL       Respiratory WDL    Respiratory WDL X;cough       Skin Circulation/Temperature WDL    Skin Circulation/Temperature WDL WDL       Cardiac WDL    Cardiac WDL WDL       Peripheral/Neurovascular WDL    Peripheral Neurovascular WDL WDL       Cognitive/Neuro/Behavioral WDL    Cognitive/Neuro/Behavioral WDL WDL

## 2023-09-24 NOTE — ED PROVIDER NOTES
History     Chief Complaint:  Otalgia, Headache, and Cough       HPI   Jenifer Delong is a 53 year old female who was recently admitted to the hospital 1 month ago for facial cellulitis, mastoiditis, left otitis media and left-sided parotitis treated with IV Unasyn in the hospital and then discharged on Augmentin after ENT consultation who arrives due to cough, nasal congestion and right ear pain.  The patient reports that she has been coughing with nasal congestion for the past 2 days and then developed right ear pain and swelling anterior to her right ear yesterday evening which worsened today.  She reports that her right ear pain has been causing her to have a right-sided headache today.  She denies any chest pain, shortness of breath, leg pain or swelling, vision changes, sore throat, neck pain or stiffness, fevers or chills.  She denies trouble swallowing.      Independent Historian:   None - Patient Only    Review of External Notes:   none       Medications:    Wellbutrin   Epinephrine   Zestril   Effexor     Past Medical History:    Breast cancer  Depressive disorder  Hypertension  Migraine    Past Surgical History:     section   Mastectomy  Hysteroscopy  Breast reconstruction   [place] tooth extraction   Carpal tunnel release     Physical Exam   Patient Vitals for the past 24 hrs:   BP Temp Temp src Pulse Resp SpO2   23 1848 (!) 158/111 97  F (36.1  C) Temporal 96 18 99 %        Physical Exam  Nursing note and vitals reviewed.  Constitutional:  Appears well-developed and well-nourished.   HENT:   Head:    Tenderness with mild swelling over the right parotid gland just anterior to the right ear.  No overlying redness or warmth.  No palpable fluid collection.  Mastoid region is nontender without swelling.  No submandibular swelling or tenderness.  Right TM is dull and mildly erythematous.  There is no swelling or drainage in the left ear canal.  Left TM is clear.  Mouth/Throat:   Oropharynx is  clear and moist. No oropharyngeal exudate.   Eyes:    Pupils are equal, round, and reactive to light.   Neck:    No meningeal signs.  Normal range of motion. Neck supple.      No tracheal deviation present. No thyromegaly present.   Cardiovascular:  Normal rate, regular rhythm, no murmur   Pulmonary/Chest: Breath sounds are clear and equal without wheezes or crackles.  Abdominal:   Soft. Bowel sounds are normal. Exhibits no distension and      no mass. There is no tenderness.      There is no rebound and no guarding.   Musculoskeletal:  Exhibits no edema.   Lymphadenopathy:  No cervical adenopathy.   Neurological:   Alert and oriented to person, place, and time. GCS 15.  CN 2-12 intact.  and proximal upper extremity strength strong and equal.  Bilateral lower extremity strength strong and equal, including strong dorsiflexion and plantarflexion strength.  Sensation intact and equal to the face, arms and legs.  No facial droop or weakness. Normal speech.  Follows commands and answers questions normally.    Skin:    Skin is warm and dry. No rash noted. No pallor.       Emergency Department Course     Imaging:  XR Chest 2 Views   Final Result   IMPRESSION: Heart size is normal. Lungs are clear bilaterally. Mediastinum and visualized bony structures are unremarkable. Clips in the right axilla.         Report per radiology    Laboratory:  Labs Ordered and Resulted from Time of ED Arrival to Time of ED Departure   INFLUENZA A/B, RSV, & SARS-COV2 PCR - Normal       Result Value    Influenza A PCR Negative      Influenza B PCR Negative      RSV PCR Negative      SARS CoV2 PCR Negative          Emergency Department Course & Assessments:    Interventions:  Medications   oxyCODONE (ROXICODONE) tablet 5 mg (5 mg Oral $Given 9/23/23 1926)   amoxicillin-clavulanate (AUGMENTIN) 875-125 MG per tablet 1 tablet (1 tablet Oral $Given 9/23/23 1926)        Assessments:  Recheck she is feeling much improved    Independent  Interpretation (X-rays, CTs, rhythm strip):  I reviewed her chest x-ray findings and do not see any pneumonia or pneumothorax.    Consultations/Discussion of Management or Tests:  None        Social Determinants of Health affecting care:   Healthcare Access/Compliance    Disposition:  The patient was discharged to home.     Impression & Plan        Medical Decision Making:  I found this patient to have right otitis media with signs of early parotitis on the right with an upper respiratory infection without any sign of pneumonia on chest x-ray.  COVID-19 testing is negative.  She had similar symptoms on the left side 1 month ago which resolved after Augmentin so she was prescribed an additional 10-day course of Augmentin as well as Ciprodex eardrops and oxycodone for pain.  She was told not to drive a car or drink alcohol for 6 hours after the oxycodone.  There was no sign of meningitis, encephalitis or mastoiditis at this time.  She was told to call on Monday morning to schedule appointment for close follow-up with her ENT surgeon on Monday or Tuesday.  She was told to return if any symptoms worsen or for any other concerning symptoms.  I felt she could be safely discharged home.    Diagnosis:    ICD-10-CM    1. Parotitis, acute  K11.21       2. Acute right otitis media  H66.91       3. Upper respiratory tract infection, unspecified type  J06.9            Discharge Medications:  New Prescriptions    No medications on file        9/23/2023   Debora Beckwith MD [place], Cheri Lee, MD  09/24/23 0047

## 2023-09-24 NOTE — DISCHARGE INSTRUCTIONS
Call your ENT surgeon Monday to schedule an appointment in clinic for follow-up Monday or Tuesday.    Continue taking ibuprofen and/or tylenol as needed for pain.    Opioid Medication Information    You have been given a prescription for an opioid (narcotic) pain medicine and/or have received a pain medicine while here in the Emergency Department. These medicines can make you drowsy or impaired. You must not drive, operate dangerous equipment, or engage in any other dangerous activities while taking these medications. If you drive while taking these medications, you could be arrested for DUI, or driving under the influence. Do not drink any alcohol while you are taking these medications.   Opioid pain medications can cause addiction. If you have a history of chemical dependency of any type, you are at a higher risk of becoming addicted to pain medications.  Only take these prescribed medications to treat your pain when all other options have been tried. Take it for as short a time and as few doses as possible. Store your pain pills in a secure place, as they are frequently stolen and provide a dangerous opportunity for children or visitors in your house to start abusing these powerful medications. We will not replace any lost or stolen medicine.  As soon as your pain is better, you should flush all your remaining medication.   Many prescription pain medications contain Tylenol  (acetaminophen), including Vicodin , Tylenol #3 , Norco , Lortab , and Percocet .  You should not take any extra pills of Tylenol  if you are using these prescription medications or you can get very sick.  Do not ever take more than 4000 mg of acetaminophen in any 24 hour period.  All opioids tend to cause constipation. Drink plenty of water and eat foods that have a lot of fiber, such as fruits, vegetables, prune juice, apple juice and high fiber cereal.  Take a laxative if you don t move your bowels at least every other day. Miralax , Milk  of Magnesia, Colace , or Senna  can be used to keep you regular.

## 2023-10-19 ENCOUNTER — APPOINTMENT (OUTPATIENT)
Dept: CT IMAGING | Facility: CLINIC | Age: 53
End: 2023-10-19
Attending: EMERGENCY MEDICINE
Payer: COMMERCIAL

## 2023-10-19 ENCOUNTER — APPOINTMENT (OUTPATIENT)
Dept: GENERAL RADIOLOGY | Facility: CLINIC | Age: 53
End: 2023-10-19
Attending: EMERGENCY MEDICINE
Payer: COMMERCIAL

## 2023-10-19 ENCOUNTER — HOSPITAL ENCOUNTER (EMERGENCY)
Facility: CLINIC | Age: 53
Discharge: HOME | End: 2023-10-19
Attending: EMERGENCY MEDICINE | Admitting: EMERGENCY MEDICINE
Payer: COMMERCIAL

## 2023-10-19 VITALS
TEMPERATURE: 97.7 F | BODY MASS INDEX: 32.96 KG/M2 | SYSTOLIC BLOOD PRESSURE: 126 MMHG | WEIGHT: 210 LBS | RESPIRATION RATE: 20 BRPM | DIASTOLIC BLOOD PRESSURE: 88 MMHG | OXYGEN SATURATION: 97 % | HEIGHT: 67 IN | HEART RATE: 86 BPM

## 2023-10-19 DIAGNOSIS — S16.1XXA CERVICAL STRAIN, INITIAL ENCOUNTER: ICD-10-CM

## 2023-10-19 DIAGNOSIS — R07.89 CHEST WALL PAIN: ICD-10-CM

## 2023-10-19 DIAGNOSIS — R10.32 LEFT LOWER QUADRANT ABDOMINAL PAIN: ICD-10-CM
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
## 2023-10-19 LAB
ALBUMIN SERPL BCG-MCNC: 4.5 G/DL (ref 3.5–5.2)
ALBUMIN UR-MCNC: NEGATIVE MG/DL
ALP SERPL-CCNC: 74 U/L (ref 35–104)
ALT SERPL W P-5'-P-CCNC: 25 U/L (ref 0–50)
ANION GAP SERPL CALCULATED.3IONS-SCNC: 11 MMOL/L (ref 7–15)
APPEARANCE UR: CLEAR
AST SERPL W P-5'-P-CCNC: 30 U/L (ref 0–45)
BACTERIA #/AREA URNS HPF: ABNORMAL /HPF
BASO+EOS+MONOS # BLD AUTO: NORMAL 10*3/UL
BASO+EOS+MONOS NFR BLD AUTO: NORMAL %
BASOPHILS # BLD AUTO: 0 10E3/UL (ref 0–0.2)
BASOPHILS NFR BLD AUTO: 0 %
BILIRUB SERPL-MCNC: 0.3 MG/DL
BILIRUB UR QL STRIP: NEGATIVE
BUN SERPL-MCNC: 13.7 MG/DL (ref 6–20)
CALCIUM SERPL-MCNC: 9.3 MG/DL (ref 8.6–10)
CHLORIDE SERPL-SCNC: 106 MMOL/L (ref 98–107)
COLOR UR AUTO: ABNORMAL
CREAT SERPL-MCNC: 0.89 MG/DL (ref 0.51–0.95)
DEPRECATED HCO3 PLAS-SCNC: 27 MMOL/L (ref 22–29)
EGFRCR SERPLBLD CKD-EPI 2021: 77 ML/MIN/1.73M2
EOSINOPHIL # BLD AUTO: 0.2 10E3/UL (ref 0–0.7)
EOSINOPHIL NFR BLD AUTO: 2 %
ERYTHROCYTE [DISTWIDTH] IN BLOOD BY AUTOMATED COUNT: 13.2 % (ref 10–15)
GLUCOSE SERPL-MCNC: 107 MG/DL (ref 70–99)
GLUCOSE UR STRIP-MCNC: NEGATIVE MG/DL
HCT VFR BLD AUTO: 40.4 % (ref 35–47)
HGB BLD-MCNC: 13 G/DL (ref 11.7–15.7)
HGB UR QL STRIP: NEGATIVE
IMM GRANULOCYTES # BLD: 0 10E3/UL
IMM GRANULOCYTES NFR BLD: 0 %
KETONES UR STRIP-MCNC: NEGATIVE MG/DL
LEUKOCYTE ESTERASE UR QL STRIP: NEGATIVE
LYMPHOCYTES # BLD AUTO: 2.4 10E3/UL (ref 0.8–5.3)
LYMPHOCYTES NFR BLD AUTO: 29 %
MCH RBC QN AUTO: 28.1 PG (ref 26.5–33)
MCHC RBC AUTO-ENTMCNC: 32.2 G/DL (ref 31.5–36.5)
MCV RBC AUTO: 87 FL (ref 78–100)
MONOCYTES # BLD AUTO: 0.6 10E3/UL (ref 0–1.3)
MONOCYTES NFR BLD AUTO: 8 %
MUCOUS THREADS #/AREA URNS LPF: PRESENT /LPF
NEUTROPHILS # BLD AUTO: 4.9 10E3/UL (ref 1.6–8.3)
NEUTROPHILS NFR BLD AUTO: 61 %
NITRATE UR QL: NEGATIVE
NRBC # BLD AUTO: 0 10E3/UL
NRBC BLD AUTO-RTO: 0 /100
PH UR STRIP: 5 [PH] (ref 5–7)
PLATELET # BLD AUTO: 373 10E3/UL (ref 150–450)
POTASSIUM SERPL-SCNC: 4.6 MMOL/L (ref 3.4–5.3)
PROT SERPL-MCNC: 7.2 G/DL (ref 6.4–8.3)
RBC # BLD AUTO: 4.62 10E6/UL (ref 3.8–5.2)
RBC URINE: 1 /HPF
SODIUM SERPL-SCNC: 144 MMOL/L (ref 135–145)
SP GR UR STRIP: 1.01 (ref 1–1.03)
SQUAMOUS EPITHELIAL: 4 /HPF
UROBILINOGEN UR STRIP-MCNC: NORMAL MG/DL
WBC # BLD AUTO: 8 10E3/UL (ref 4–11)
WBC URINE: 5 /HPF

## 2023-10-19 PROCEDURE — 74177 CT ABD & PELVIS W/CONTRAST: CPT

## 2023-10-19 PROCEDURE — 36415 COLL VENOUS BLD VENIPUNCTURE: CPT | Performed by: EMERGENCY MEDICINE

## 2023-10-19 PROCEDURE — 85025 COMPLETE CBC W/AUTO DIFF WBC: CPT | Performed by: EMERGENCY MEDICINE

## 2023-10-19 PROCEDURE — 71101 X-RAY EXAM UNILAT RIBS/CHEST: CPT | Mod: LT

## 2023-10-19 PROCEDURE — 80053 COMPREHEN METABOLIC PANEL: CPT | Performed by: EMERGENCY MEDICINE

## 2023-10-19 PROCEDURE — 250N000009 HC RX 250: Performed by: EMERGENCY MEDICINE

## 2023-10-19 PROCEDURE — 99285 EMERGENCY DEPT VISIT HI MDM: CPT | Mod: 25

## 2023-10-19 PROCEDURE — 250N000011 HC RX IP 250 OP 636: Performed by: EMERGENCY MEDICINE

## 2023-10-19 PROCEDURE — 250N000013 HC RX MED GY IP 250 OP 250 PS 637: Performed by: EMERGENCY MEDICINE

## 2023-10-19 PROCEDURE — 81003 URINALYSIS AUTO W/O SCOPE: CPT | Performed by: EMERGENCY MEDICINE

## 2023-10-19 RX ORDER — OXYCODONE HYDROCHLORIDE 5 MG/1
5 TABLET ORAL ONCE
Status: COMPLETED | OUTPATIENT
Start: 2023-10-19 | End: 2023-10-19

## 2023-10-19 RX ORDER — CYCLOBENZAPRINE HCL 10 MG
10 TABLET ORAL 3 TIMES DAILY PRN
Qty: 15 TABLET | Refills: 0 | Status: SHIPPED | OUTPATIENT
Start: 2023-10-19 | End: 2023-11-24

## 2023-10-19 RX ORDER — ONDANSETRON 4 MG/1
4 TABLET, ORALLY DISINTEGRATING ORAL ONCE
Status: COMPLETED | OUTPATIENT
Start: 2023-10-19 | End: 2023-10-19

## 2023-10-19 RX ORDER — IOPAMIDOL 755 MG/ML
500 INJECTION, SOLUTION INTRAVASCULAR ONCE
Status: COMPLETED | OUTPATIENT
Start: 2023-10-19 | End: 2023-10-19

## 2023-10-19 RX ORDER — DIAZEPAM 5 MG
5 TABLET ORAL ONCE
Status: COMPLETED | OUTPATIENT
Start: 2023-10-19 | End: 2023-10-19

## 2023-10-19 RX ADMIN — ONDANSETRON 4 MG: 4 TABLET, ORALLY DISINTEGRATING ORAL at 16:46

## 2023-10-19 RX ADMIN — SODIUM CHLORIDE 65 ML: 9 INJECTION, SOLUTION INTRAVENOUS at 20:50

## 2023-10-19 RX ADMIN — IOPAMIDOL 100 ML: 755 INJECTION, SOLUTION INTRAVENOUS at 20:50

## 2023-10-19 RX ADMIN — DIAZEPAM 5 MG: 5 TABLET ORAL at 20:23

## 2023-10-19 RX ADMIN — OXYCODONE HYDROCHLORIDE 5 MG: 5 TABLET ORAL at 20:23

## 2023-10-19 ASSESSMENT — ACTIVITIES OF DAILY LIVING (ADL)
ADLS_ACUITY_SCORE: 35
ADLS_ACUITY_SCORE: 35
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
## 2023-10-19 NOTE — LETTER
October 19, 2023      To Whom It May Concern:      Jenifer Delong was seen in our Emergency Department today, 10/19/23.  I expect her condition to improve over the next 1-3 days.  She may return to work when improved.    Sincerely,        Jorgito Muniz MD
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
## 2023-11-24 ENCOUNTER — HOSPITAL ENCOUNTER (EMERGENCY)
Facility: CLINIC | Age: 53
Discharge: HOME | End: 2023-11-24
Attending: EMERGENCY MEDICINE | Admitting: EMERGENCY MEDICINE
Payer: COMMERCIAL

## 2023-11-24 VITALS
SYSTOLIC BLOOD PRESSURE: 146 MMHG | OXYGEN SATURATION: 98 % | HEART RATE: 93 BPM | TEMPERATURE: 98.7 F | RESPIRATION RATE: 18 BRPM | DIASTOLIC BLOOD PRESSURE: 111 MMHG

## 2023-11-24 DIAGNOSIS — M54.50 RIGHT-SIDED LOW BACK PAIN WITHOUT SCIATICA, UNSPECIFIED CHRONICITY: ICD-10-CM

## 2023-11-24 PROCEDURE — 99284 EMERGENCY DEPT VISIT MOD MDM: CPT | Mod: 25

## 2023-11-24 PROCEDURE — 96374 THER/PROPH/DIAG INJ IV PUSH: CPT

## 2023-11-24 PROCEDURE — 96372 THER/PROPH/DIAG INJ SC/IM: CPT | Performed by: EMERGENCY MEDICINE

## 2023-11-24 PROCEDURE — 250N000013 HC RX MED GY IP 250 OP 250 PS 637: Performed by: EMERGENCY MEDICINE

## 2023-11-24 PROCEDURE — 250N000011 HC RX IP 250 OP 636: Performed by: EMERGENCY MEDICINE

## 2023-11-24 RX ORDER — CYCLOBENZAPRINE HCL 10 MG
10 TABLET ORAL ONCE
Status: COMPLETED | OUTPATIENT
Start: 2023-11-24 | End: 2023-11-24

## 2023-11-24 RX ORDER — KETOROLAC TROMETHAMINE 15 MG/ML
15 INJECTION, SOLUTION INTRAMUSCULAR; INTRAVENOUS ONCE
Status: COMPLETED | OUTPATIENT
Start: 2023-11-24 | End: 2023-11-24

## 2023-11-24 RX ORDER — CYCLOBENZAPRINE HCL 10 MG
10 TABLET ORAL 3 TIMES DAILY PRN
Qty: 6 TABLET | Refills: 0 | Status: SHIPPED | OUTPATIENT
Start: 2023-11-24

## 2023-11-24 RX ORDER — OXYCODONE HYDROCHLORIDE 5 MG/1
5 TABLET ORAL ONCE
Status: COMPLETED | OUTPATIENT
Start: 2023-11-24 | End: 2023-11-24

## 2023-11-24 RX ORDER — OXYCODONE HYDROCHLORIDE 5 MG/1
5 TABLET ORAL EVERY 6 HOURS PRN
Qty: 2 TABLET | Refills: 0 | Status: SHIPPED | OUTPATIENT
Start: 2023-11-24 | End: 2024-01-24

## 2023-11-24 RX ORDER — LIDOCAINE 4 G/G
1 PATCH TOPICAL ONCE
Status: DISCONTINUED | OUTPATIENT
Start: 2023-11-24 | End: 2023-11-25 | Stop reason: HOSPADM

## 2023-11-24 RX ADMIN — CYCLOBENZAPRINE 10 MG: 10 TABLET, FILM COATED ORAL at 18:45

## 2023-11-24 RX ADMIN — HYDROMORPHONE HYDROCHLORIDE 1 MG: 1 INJECTION, SOLUTION INTRAMUSCULAR; INTRAVENOUS; SUBCUTANEOUS at 20:59

## 2023-11-24 RX ADMIN — KETOROLAC TROMETHAMINE 15 MG: 15 INJECTION, SOLUTION INTRAMUSCULAR; INTRAVENOUS at 18:45

## 2023-11-24 RX ADMIN — OXYCODONE HYDROCHLORIDE 5 MG: 5 TABLET ORAL at 21:47

## 2023-11-24 RX ADMIN — LIDOCAINE 1 PATCH: 4 PATCH TOPICAL at 18:45

## 2023-11-24 ASSESSMENT — ACTIVITIES OF DAILY LIVING (ADL)
ADLS_ACUITY_SCORE: 33
ADLS_ACUITY_SCORE: 35
ADLS_ACUITY_SCORE: 35
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
## 2023-11-25 NOTE — DISCHARGE INSTRUCTIONS
We recommend that you keep your appointment with physical therapy and your chiropractor on Monday.  We recommend that you try Tylenol, ibuprofen, lidocaine patches, heat and/or ice.  We are giving you a prescription for a few tablets of a muscle relaxer and a pain pill.  Please return to the emergency department if you develop any new or concerning symptoms.

## 2024-01-24 ENCOUNTER — APPOINTMENT (OUTPATIENT)
Dept: GENERAL RADIOLOGY | Facility: CLINIC | Age: 54
End: 2024-01-24
Attending: EMERGENCY MEDICINE
Payer: COMMERCIAL

## 2024-01-24 ENCOUNTER — HOSPITAL ENCOUNTER (EMERGENCY)
Facility: CLINIC | Age: 54
Discharge: HOME | End: 2024-01-24
Attending: STUDENT IN AN ORGANIZED HEALTH CARE EDUCATION/TRAINING PROGRAM | Admitting: STUDENT IN AN ORGANIZED HEALTH CARE EDUCATION/TRAINING PROGRAM
Payer: COMMERCIAL

## 2024-01-24 VITALS
RESPIRATION RATE: 18 BRPM | HEART RATE: 93 BPM | WEIGHT: 214 LBS | TEMPERATURE: 97.6 F | BODY MASS INDEX: 33.59 KG/M2 | SYSTOLIC BLOOD PRESSURE: 166 MMHG | DIASTOLIC BLOOD PRESSURE: 94 MMHG | OXYGEN SATURATION: 98 % | HEIGHT: 67 IN
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
## 2024-01-24 DIAGNOSIS — S61.452A OPEN WOUND OF LEFT HAND DUE TO DOG BITE: ICD-10-CM

## 2024-01-24 PROCEDURE — 90471 IMMUNIZATION ADMIN: CPT | Performed by: STUDENT IN AN ORGANIZED HEALTH CARE EDUCATION/TRAINING PROGRAM

## 2024-01-24 PROCEDURE — 90715 TDAP VACCINE 7 YRS/> IM: CPT | Performed by: STUDENT IN AN ORGANIZED HEALTH CARE EDUCATION/TRAINING PROGRAM

## 2024-01-24 PROCEDURE — 250N000011 HC RX IP 250 OP 636: Performed by: STUDENT IN AN ORGANIZED HEALTH CARE EDUCATION/TRAINING PROGRAM

## 2024-01-24 PROCEDURE — 250N000013 HC RX MED GY IP 250 OP 250 PS 637: Performed by: STUDENT IN AN ORGANIZED HEALTH CARE EDUCATION/TRAINING PROGRAM

## 2024-01-24 PROCEDURE — 12001 RPR S/N/AX/GEN/TRNK 2.5CM/<: CPT

## 2024-01-24 PROCEDURE — 99284 EMERGENCY DEPT VISIT MOD MDM: CPT | Mod: 25

## 2024-01-24 PROCEDURE — 73130 X-RAY EXAM OF HAND: CPT | Mod: RT

## 2024-01-24 RX ORDER — IBUPROFEN 600 MG/1
600 TABLET, FILM COATED ORAL ONCE
Status: COMPLETED | OUTPATIENT
Start: 2024-01-24 | End: 2024-01-24

## 2024-01-24 RX ORDER — OXYCODONE HYDROCHLORIDE 5 MG/1
5-10 TABLET ORAL EVERY 6 HOURS PRN
Qty: 10 TABLET | Refills: 0 | Status: SHIPPED | OUTPATIENT
Start: 2024-01-24 | End: 2024-01-25

## 2024-01-24 RX ORDER — OXYCODONE HYDROCHLORIDE 5 MG/1
5 TABLET ORAL ONCE
Status: COMPLETED | OUTPATIENT
Start: 2024-01-24 | End: 2024-01-24

## 2024-01-24 RX ADMIN — AMOXICILLIN AND CLAVULANATE POTASSIUM 1 TABLET: 875; 125 TABLET, FILM COATED ORAL at 23:22

## 2024-01-24 RX ADMIN — IBUPROFEN 600 MG: 600 TABLET, FILM COATED ORAL at 22:05

## 2024-01-24 RX ADMIN — CLOSTRIDIUM TETANI TOXOID ANTIGEN (FORMALDEHYDE INACTIVATED), CORYNEBACTERIUM DIPHTHERIAE TOXOID ANTIGEN (FORMALDEHYDE INACTIVATED), BORDETELLA PERTUSSIS TOXOID ANTIGEN (GLUTARALDEHYDE INACTIVATED), BORDETELLA PERTUSSIS FILAMENTOUS HEMAGGLUTININ ANTIGEN (FORMALDEHYDE INACTIVATED), BORDETELLA PERTUSSIS PERTACTIN ANTIGEN, AND BORDETELLA PERTUSSIS FIMBRIAE 2/3 ANTIGEN 0.5 ML: 5; 2; 2.5; 5; 3; 5 INJECTION, SUSPENSION INTRAMUSCULAR at 23:22

## 2024-01-24 RX ADMIN — OXYCODONE HYDROCHLORIDE 5 MG: 5 TABLET ORAL at 22:05

## 2024-01-24 ASSESSMENT — ACTIVITIES OF DAILY LIVING (ADL)
ADLS_ACUITY_SCORE: 33
ADLS_ACUITY_SCORE: 33

## 2024-01-24 NOTE — Clinical Note
Jenifer Delong was seen and treated in our emergency department on 1/24/2024.  She may return to work on 01/27/2024.       If you have any questions or concerns, please don't hesitate to call.      Priscila Vanegas PA-C

## 2024-01-25 RX ORDER — OXYCODONE HYDROCHLORIDE 5 MG/1
5 TABLET ORAL EVERY 6 HOURS PRN
Qty: 6 TABLET | Refills: 0 | Status: SHIPPED | OUTPATIENT
Start: 2024-01-25

## 2024-01-25 NOTE — ED TRIAGE NOTES
Pts dog's were fighting and she reached between them to stop the fight. Pts dogs vaccinated. Last TDAP 2011. Dog bites to right hand. Previous mastectomy on same side. Deep laceration noted to right hand, bleeding controlled in triage. Pt states lower right arm numb and into fingers. States difficulty in moving first three fingers. CAP refill WDL. Dressing applied in triage.
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
## 2024-01-25 NOTE — DISCHARGE INSTRUCTIONS
Take antibiotics as prescribed.  Call tomorrow to schedule an appointment with [facility] orthopedics.  I placed to phone call and they should be contacting you regarding a hand specialist.  Take Tylenol ibuprofen for baseline pain.  Use pain medicine only as needed for severe, breakthrough pain..  Do not drink alcohol, drive, operate machinery, or swim down this medication.  Return to the ED immediately should you develop fevers, chills, redness around the wound, white drainage from the wound, worsening pain or swelling, or any further concerns.  Dog bites are at high risk for infection.  Discharge Instructions  Laceration (Cut)    You were seen today for a laceration (cut).  Your provider examined your laceration for any problems such a buried foreign body (like glass, a splinter, or gravel), or injury to blood vessels, tendons, and nerves.  Your provider may have also rinsed and/or scrubbed your laceration to help prevent an infection. It may not be possible to find all problems with your laceration on the first visit; occasionally foreign bodies or a tendon injury can go undetected.    Your laceration may have been closed in one of several ways:  No closure: many wounds will heal just fine without closure.  Stitches: regular stitches that require removal.  Staples: skin staples are often used in the scalp/head.  Wound adhesive (glue): skin glue can be used for certain lacerations and doesn t require removal.  Wound strips (aka Butterfly bandages or steri-strips): these are bandages that help to close a wound.  Absorbable stitches:  dissolving  stitches that go away on their own and usually don t require removal.    A small percentage of wounds will develop an infection regardless of how well the wound is cared for. Antibiotics are generally not indicated to prevent an infection so are only given for a small number of high-risk wounds. Some lacerations are too high risk to close, and are left open to heal  because closure can increase the likelihood that an infection will develop.    Remember that all lacerations, no matter how expertly repaired, will cause scarring. We consider many factors, techniques, and materials, in our efforts to provide the best possible cosmetic outcome.    Generally, every Emergency Department visit should have a follow-up clinic visit with either a primary or a specialty clinic/provider. Please follow-up as instructed by your emergency provider today.     Return to the Emergency Department right away if:  You have more redness, swelling, pain, drainage (pus), a bad smell, or red streaking from your laceration as these symptoms could indicate an infection.  You have a fever of 100.4 F or more.  You have bleeding that you cannot stop at home. If your cut starts to bleed, hold pressure on the bleeding area with a clean cloth or put pressure over the bandage.  If the bleeding does not stop after using constant pressure for 30 minutes, you should return to the Emergency Department for further treatment.  An area past the laceration is cool, pale, or blue compared with the other side, or has a slower return of color when squeezed.  Your dressing seems too tight or starts to get uncomfortable or painful. For children, signs of a problem might be irritability or restlessness.  You have loss of normal function or use of an area, such as being unable to straighten or bend a finger normally.  You have a numb area past the laceration.    Return to the Emergency Department or see your regular provider if:  The laceration starts to come open.   You have something coming out of the cut or a feeling that there is something in the laceration.  Your wound will not heal, or keeps breaking open. There can always be glass, wood, dirt or other things in any wound.  They will not always show up, even on x-rays.  If a wound does not heal, this may be why, and it is important to follow-up with your regular  provider.    Home Care:  Take your dressing off in 12-24 hours, or as instructed by your provider, to check your laceration. Remove the dressing sooner if it seems too tight or painful, or if it is getting numb, tingly, or pale past the dressing.  Gently wash your laceration 1-2 times daily with clean water and mild soap. It is okay to shower or run clean water over the laceration, but do not let the laceration soak in water (no swimming).  If your laceration was closed with wound adhesive or strips: pat it dry and leave it open to the air. For all other repairs: after you wash your laceration, or at least 2 times a day, apply antibiotic ointment (such as Neosporin  or Bacitracin ) to the laceration, then cover it with a Band-Aid  or gauze.  Keep the laceration clean. Wear gloves or other protective clothing if you are around dirt.    Follow-up for removal:  If your wound was closed with staples or regular stitches, they need to be removed according to the instructions and timeline specified by your provider today.  If your wound was closed with absorbable ( dissolving ) sutures, they should fall out, dissolve, or not be visible in about one week. If they are still visible, then they should be removed according to the instructions and timeline specified by your provider today.    Scars:  To help minimize scarring:  Wear sunscreen over the healed laceration when out in the sun.  Massage the area regularly once healed.  You may apply Vitamin E to the healed wound.  Wait. Scars improve in appearance over months and years.    If you were given a prescription for medicine here today, be sure to read all of the information (including the package insert) that comes with your prescription.  This will include important information about the medicine, its side effects, and any warnings that you need to know about.  The pharmacist who fills the prescription can provide more information and answer questions you may have about  the medicine.  If you have questions or concerns that the pharmacist cannot address, please call or return to the Emergency Department.       Remember that you can always come back to the Emergency Department if you are not able to see your regular provider in the amount of time listed above, if you get any new symptoms, or if there is anything that worries you.  Opioid Medication Information    You have been given a prescription for an opioid (narcotic) pain medicine and/or have received a pain medicine while here in the Emergency Department. These medicines can make you drowsy or impaired. You must not drive, operate dangerous equipment, or engage in any other dangerous activities while taking these medications. If you drive while taking these medications, you could be arrested for driving under the influence (DUI). Do not drink any alcohol while you are taking these medications.     Opioid pain medications can cause addiction. If you have a history of chemical dependency of any type, you are at a higher risk of becoming addicted to pain medications.  Only take these prescribed medications to treat your pain when all other options have been tried. Take it for as short a time and as few doses as possible. Store your pain pills in a secure place, as they are frequently stolen and provide a dangerous opportunity for children or visitors in your house to start abusing these powerful medications. We will not replace any lost or stolen medicine.    If you do not finish your medication, it is a good idea to get rid of it but please do not flush it down the toilet. Please dispose of the remaining medication at a local pharmacy or law enforcement facility. The [place] [employer] has additional information on medication disposal: https://www.pca.[facility].[place].us/living-green/managing-unwanted-medications.      Many prescription pain medications contain Tylenol  (acetaminophen), including Vicodin , Tylenol #3 ,  Norco , Lortab , and Percocet .  You should not take any extra pills of Tylenol  if you are using these prescription medications or you can get very sick.  Do not ever take more than 3000 mg of acetaminophen in any 24 hour period.    All opioids tend to cause constipation. Drink plenty of water and eat foods that have a lot of fiber, such as fruits, vegetables, prune juice, apple juice and high fiber cereal.  Take a laxative if you don t move your bowels at least every other day. Miralax , Milk of Magnesia, Colace , or Senna  can be used to keep you regular.

## 2024-03-24 ENCOUNTER — HOSPITAL ENCOUNTER (EMERGENCY)
Facility: CLINIC | Age: 54
Discharge: HOME | End: 2024-03-24
Attending: PHYSICIAN ASSISTANT | Admitting: PHYSICIAN ASSISTANT
Payer: COMMERCIAL

## 2024-03-24 VITALS
SYSTOLIC BLOOD PRESSURE: 158 MMHG | HEIGHT: 67 IN | BODY MASS INDEX: 32.96 KG/M2 | WEIGHT: 210 LBS | HEART RATE: 110 BPM | RESPIRATION RATE: 16 BRPM | TEMPERATURE: 99.2 F | DIASTOLIC BLOOD PRESSURE: 101 MMHG | OXYGEN SATURATION: 98 %

## 2024-03-24 DIAGNOSIS — H60.502 ACUTE OTITIS EXTERNA OF LEFT EAR, UNSPECIFIED TYPE: ICD-10-CM

## 2024-03-24 PROCEDURE — 99283 EMERGENCY DEPT VISIT LOW MDM: CPT

## 2024-03-24 PROCEDURE — 250N000013 HC RX MED GY IP 250 OP 250 PS 637: Performed by: PHYSICIAN ASSISTANT

## 2024-03-24 RX ORDER — CIPROFLOXACIN AND DEXAMETHASONE 3; 1 MG/ML; MG/ML
4 SUSPENSION/ DROPS AURICULAR (OTIC) 2 TIMES DAILY
Qty: 7.5 ML | Refills: 0 | Status: SHIPPED | OUTPATIENT
Start: 2024-03-24 | End: 2024-03-31

## 2024-03-24 RX ORDER — OXYCODONE HYDROCHLORIDE 5 MG/1
5 TABLET ORAL ONCE
Status: COMPLETED | OUTPATIENT
Start: 2024-03-24 | End: 2024-03-24

## 2024-03-24 RX ADMIN — OXYCODONE HYDROCHLORIDE 5 MG: 5 TABLET ORAL at 23:06

## 2024-03-24 ASSESSMENT — COLUMBIA-SUICIDE SEVERITY RATING SCALE - C-SSRS
6. HAVE YOU EVER DONE ANYTHING, STARTED TO DO ANYTHING, OR PREPARED TO DO ANYTHING TO END YOUR LIFE?: NO
2. HAVE YOU ACTUALLY HAD ANY THOUGHTS OF KILLING YOURSELF IN THE PAST MONTH?: NO
1. IN THE PAST MONTH, HAVE YOU WISHED YOU WERE DEAD OR WISHED YOU COULD GO TO SLEEP AND NOT WAKE UP?: NO

## 2024-03-24 ASSESSMENT — ACTIVITIES OF DAILY LIVING (ADL): ADLS_ACUITY_SCORE: 35

## 2024-03-25 NOTE — ED PROVIDER NOTES
"  History     Chief Complaint:  Otalgia       HPI   Jenifer Delong is a 53 year old female who presents to the ED for evaluation of otalgia. Patient notes onset of left sided ear pain that began about 2 days ago. Pain has been constant, worsening over the last few hours. Hearing decreased on left side. No discharge from the ear. No fevers or chills. No rhinorrhea or congestion. No sore throat. She has been using Tylenol and ibuprofen for pain.     Independent Historian:   Spouse aids history - notes was in hot tub day before onset of pain    Review of External Notes:   None    Medications:    ciprofloxacin-dexAMETHasone (CIPRODEX) 0.3-0.1 % otic suspension  amoxicillin-clavulanate (AUGMENTIN) 875-125 MG tablet  buPROPion (WELLBUTRIN SR) 150 MG 12 hr tablet  cyclobenzaprine (FLEXERIL) 10 MG tablet  EPINEPHrine (EPIPEN 2-ADIS) 0.3 MG/0.3ML injection  levonorgestrel (MIRENA) 20 MCG/24HR IUD  lisinopril (ZESTRIL) 10 MG tablet  Multiple Vitamin (MULTIVITAMIN ADULT PO)  oxyCODONE (ROXICODONE) 5 MG tablet  venlafaxine (EFFEXOR XR) 75 MG 24 hr capsule        Past Medical History:    Past Medical History:   Diagnosis Date    Abnormal Pap smear of cervix     Breast cancer     Depressive disorder     Hypertension     Migraine        Past Surgical History:    Past Surgical History:   Procedure Laterality Date     SECTION      IR LYMPH NODE BIOPSY  2015    LAPAROSCOPY DIAGNOSTIC (GYN)      MASTECTOMY MODIFIED RADICAL Right 2016    OPERATIVE HYSTEROSCOPY  2013    Procedure: OPERATIVE HYSTEROSCOPY;  Hysteroscopic removal of retained Intrauterine device, with placement of Nexplanon Left ;  Surgeon: Dayna Granger MD;  Location:  OR        Physical Exam   Patient Vitals for the past 24 hrs:   BP Temp Pulse Resp SpO2 Height Weight   24 2232 (!) 158/101 -- -- -- -- -- --   24 2230 -- 99.2  F (37.3  C) 110 16 98 % 1.702 m (5' 7\") 95.3 kg (210 lb)        Physical Exam  Constitutional: " Pleasant. Cooperative.   Eyes: Pupils equally round and reactive  HENT: Head is normal in appearance. Left canal is edematous, erythematous. Unable to visualize left TM. Right TM and canal are normal. Moist mucous membranes. No oropharyngeal erythema. No exudates. No palatal asymmetry. Uvula midline. No trismus. No muffled voice. Tolerating their secretions.  Cardiovascular: Regular rate and rhythm.  Respiratory: Normal respiratory effort, lungs are clear bilaterally.  Neck: Normal ROM. Left sided anterior cervical lymphadenopathy noted.  Skin: Normal, without rash.  Neurologic: Cranial nerves grossly intact. Alert.  Nursing notes and vital signs reviewed.      Emergency Department Course     Emergency Department Course & Assessments:    Interventions:  Medications   oxyCODONE (ROXICODONE) tablet 5 mg (5 mg Oral $Given 3/24/24 5421)      Independent Interpretation (X-rays, CTs, rhythm strip):  None    Consultations/Discussion of Management or Tests:  None        Social Determinants of Health affecting care:   None    Disposition:  The patient was discharged.     Impression & Plan      MIPS (If applicable):  N/A    Medical Decision Making:  Jenifer Delong is a 53 year old female with a history of otitis externa with associated facial cellulitis requiring hospitalization, as well as chemical dependency, who presents to the ED for evaluation of left sided ear pain. See HPI as above for additional details. Vitals and physical exam as above. DDx was broad and included OM, OE, mastoiditis, referred pain from teeth, facial cellulitis, shingles, TMJ, eustachian tube dysfunction, PTA, RPA, amongst others. Exam suggestive for OE. No suggestion for facial cellulitis at this time. Unable to visualize left TM currently, however given history of recent hot tub use followed by ear pain and exam suggestive for OE, will start topical otic drops. Advised Tylenol and ibuprofen for pain. Patient requested pain medication for home,  discussed not indicated for OE. Did provide dose of oxycodone here. [place] patient was safe for discharge to home. Referral for ENT provided. Discussed reasons to return. All questions answered. Patient discharged to home in stable condition.    Diagnosis:    ICD-10-CM    1. Acute otitis externa of left ear, unspecified type  H60.502 Adult ENT  Referral           Discharge Medications:  Discharge Medication List as of 3/24/2024 11:07 PM        START taking these medications    Details   ciprofloxacin-dexAMETHasone (CIPRODEX) 0.3-0.1 % otic suspension Place 4 drops Into the left ear 2 times daily for 7 days, Disp-7.5 mL, R-0, E-Prescribe           This record was created at least in part using electronic voice recognition software, so please excuse any typographical errors.         Felix Lopez PA-C  03/24/24 [id number]

## 2024-03-25 NOTE — ED TRIAGE NOTES
Left sided ear pain that started 2 days ago. Has appointment at  in the morning but the pain became too severe tonight.

## 2024-03-25 NOTE — TELEPHONE ENCOUNTER
FUTURE VISIT INFORMATION      FUTURE VISIT INFORMATION:  Date: 6/26/24  Time: 9 AM  Location: [facility]  REFERRAL INFORMATION:  Referring provider:    Referring providers clinic:    Reason for visit/diagnosis:  per patient ear infection scheduled per referral notes. confirmed [facility]     RECORDS REQUESTED FROM      Clinic name Comments Records Status Imaging Status   [facility] Emergency Dept 3/25/24 referral/ ER note- Felix Lopez PA-C  [facility] 8/20/223 CT neck [facility] PACS

## 2024-03-25 NOTE — DISCHARGE INSTRUCTIONS
For pain, you may take Tylenol 1000mg every 8 hours and ibuprofen 400mg every 6 hours for pain.  Start ear drops as prescribed.  Follow up with Ear, Nose, and Throat as per referral.

## 2024-04-14 ENCOUNTER — HEALTH MAINTENANCE LETTER (OUTPATIENT)
Age: 54
End: 2024-04-14

## 2024-06-21 DIAGNOSIS — Z01.10 ENCOUNTER FOR HEARING TEST: Primary | ICD-10-CM

## 2024-06-23 ENCOUNTER — HEALTH MAINTENANCE LETTER (OUTPATIENT)
Age: 54
End: 2024-06-23

## 2024-06-26 ENCOUNTER — PRE VISIT (OUTPATIENT)
Dept: OTOLARYNGOLOGY | Facility: CLINIC | Age: 54
End: 2024-06-26

## 2024-10-23 NOTE — LETTER
January 10, 2018      To Whom It May Concern:      Jenifer Delong was seen in our Emergency Department today, 01/10/18.  I expect her condition to improve over the next 2-3 days.  She may return to work/school when improved.    Sincerely,        Micaela Noriega RN         none

## 2025-01-13 ENCOUNTER — OFFICE VISIT (OUTPATIENT)
Dept: URGENT CARE | Facility: URGENT CARE | Age: 55
End: 2025-01-13
Payer: COMMERCIAL

## 2025-01-13 ENCOUNTER — ANCILLARY PROCEDURE (OUTPATIENT)
Dept: GENERAL RADIOLOGY | Facility: CLINIC | Age: 55
End: 2025-01-13
Attending: FAMILY MEDICINE
Payer: COMMERCIAL

## 2025-01-13 VITALS
WEIGHT: 210 LBS | OXYGEN SATURATION: 97 % | DIASTOLIC BLOOD PRESSURE: 81 MMHG | HEART RATE: 113 BPM | BODY MASS INDEX: 32.89 KG/M2 | TEMPERATURE: 103.4 F | RESPIRATION RATE: 22 BRPM | SYSTOLIC BLOOD PRESSURE: 144 MMHG

## 2025-01-13 DIAGNOSIS — R68.89 FLU-LIKE SYMPTOMS: ICD-10-CM

## 2025-01-13 DIAGNOSIS — R50.9 FEVER IN ADULT: ICD-10-CM

## 2025-01-13 DIAGNOSIS — M79.10 MYALGIA: ICD-10-CM

## 2025-01-13 DIAGNOSIS — R07.9 RIGHT-SIDED CHEST PAIN: ICD-10-CM

## 2025-01-13 DIAGNOSIS — R05.1 ACUTE COUGH: Primary | ICD-10-CM

## 2025-01-13 LAB
FLUAV AG SPEC QL IA: NEGATIVE
FLUBV AG SPEC QL IA: NEGATIVE
SARS-COV-2 RNA RESP QL NAA+PROBE: NEGATIVE

## 2025-01-13 PROCEDURE — 87804 INFLUENZA ASSAY W/OPTIC: CPT | Performed by: FAMILY MEDICINE

## 2025-01-13 PROCEDURE — 71046 X-RAY EXAM CHEST 2 VIEWS: CPT | Mod: TC | Performed by: RADIOLOGY

## 2025-01-13 PROCEDURE — 99204 OFFICE O/P NEW MOD 45 MIN: CPT | Performed by: FAMILY MEDICINE

## 2025-01-13 PROCEDURE — 87635 SARS-COV-2 COVID-19 AMP PRB: CPT | Performed by: FAMILY MEDICINE

## 2025-01-13 PROCEDURE — 87798 DETECT AGENT NOS DNA AMP: CPT | Performed by: FAMILY MEDICINE

## 2025-01-13 RX ORDER — ACETAMINOPHEN 325 MG/1
325-650 TABLET ORAL
COMMUNITY
Start: 2024-02-01

## 2025-01-13 RX ORDER — BENZONATATE 100 MG/1
100 CAPSULE ORAL 3 TIMES DAILY PRN
Qty: 30 CAPSULE | Refills: 0 | Status: SHIPPED | OUTPATIENT
Start: 2025-01-13

## 2025-01-13 RX ORDER — OSELTAMIVIR PHOSPHATE 75 MG/1
75 CAPSULE ORAL 2 TIMES DAILY
Qty: 10 CAPSULE | Refills: 0 | Status: SHIPPED | OUTPATIENT
Start: 2025-01-13 | End: 2025-01-13

## 2025-01-13 RX ORDER — ACETAMINOPHEN 325 MG/1
650 TABLET ORAL ONCE
Status: COMPLETED | OUTPATIENT
Start: 2025-01-13 | End: 2025-01-13

## 2025-01-13 RX ORDER — BUSPIRONE HYDROCHLORIDE 15 MG/1
30 TABLET ORAL
COMMUNITY
Start: 2024-11-01

## 2025-01-13 RX ORDER — CODEINE PHOSPHATE AND GUAIFENESIN 10; 100 MG/5ML; MG/5ML
1-2 SOLUTION ORAL EVERY 4 HOURS PRN
Qty: 80 ML | Refills: 0 | Status: SHIPPED | OUTPATIENT
Start: 2025-01-13 | End: 2025-01-16

## 2025-01-13 RX ORDER — OSELTAMIVIR PHOSPHATE 75 MG/1
75 CAPSULE ORAL 2 TIMES DAILY
Qty: 10 CAPSULE | Refills: 0 | Status: SHIPPED | OUTPATIENT
Start: 2025-01-13

## 2025-01-13 RX ADMIN — ACETAMINOPHEN 650 MG: 325 TABLET ORAL at 10:27

## 2025-01-13 NOTE — PROGRESS NOTES
Chief Complaint   Patient presents with    Urgent Care     Pt states she has had a cough for 2 days and body hurts to move.The chest is burning and while coughing something popped and she urinated on her self.Daughter had whooping cough 3 weeks.     Jenifer was seen today for urgent care.    Diagnoses and all orders for this visit:    Acute cough  -     Influenza A & B Antigen  -     COVID-19 Virus (Coronavirus) by PCR Nose  -     B. pertussis/parapertussis PCR-NP  -     XR Chest 2 Views  -     benzonatate (TESSALON) 100 MG capsule; Take 1 capsule (100 mg) by mouth 3 times daily as needed.  -     guaiFENesin-codeine (ROBITUSSIN AC) 100-10 MG/5ML solution; Take 5-10 mLs by mouth every 4 hours as needed for cough.    Fever in adult    Right-sided chest pain  -     acetaminophen (TYLENOL) tablet 650 mg  -     XR Chest 2 Views    Myalgia    Flu-like symptoms  -     Discontinue: oseltamivir (TAMIFLU) 75 MG capsule; Take 1 capsule (75 mg) by mouth 2 times daily for 5 days.  -     oseltamivir (TAMIFLU) 75 MG capsule; Take 1 capsule (75 mg) by mouth 2 times daily.        D/d  Bronchitis-viral, Pneumonia, Sinusitis, Strep pharyngitis, Tonsilitis, Viral pharyngitis, Viral syndrome, and Viral upper respiratory illness, pneumothorax    PLAN:  Symptomatic therapy suggested: push fluids, rest, gargle warm salt water, and use vaporizer or mist needed . Call or return to clinic prn if these symptoms worsen or fail to improve as anticipated.  Xray did not show any acute infiltrate   Reviewed  findings with patient.  I did discuss if covid is negative then should start tamiflu as her symptoms were like influenza     SUBJECTIVE:   Jenifer Delong is a 54 year old female who complains of nasal congestion, nasal blockage, moderate sore throat, dry cough, fever, fatigue, and myalgia and chest pain  for 2 days. She denies a history of wheezing and shortness of breath and denies a history of asthma. Patient does not smoke cigarettes.  She was  exposed to whooping cough    OBJECTIVE:  She appears well, vital signs are as noted by the nurse. Ears normal.  Throat and pharynx normal.  Neck supple. No adenopathy in the neck. Nose is congested. Sinuses non tender. The chest is clear, without wheezes or rales.    Melody Kiran MD

## 2025-01-13 NOTE — PATIENT INSTRUCTIONS
You are seen for upper respiratory infection Continue tylenol/ibuprofen for the fever and pain ,Continue pushing more fluids Symptomatic therapy suggested: push fluids, rest, gargle warm salt water, use vaporizer or mist needed , and use acetaminophen, ibuprofen as needed.   if there is worsening cough, sob , chest heaviness or fever above 102, confusion , fainting episode do follow up      Melody Kiran MD

## 2025-01-13 NOTE — LETTER
January 13, 2025      Jenifer Delong  [address] [facility] [address]        To Whom It May Concern:    Jenifer Delong was seen in our clinic with flu like symptoms please excuse her today 1/13 and tomorrow 1/14.      Sincerely,      Melody Kiran      Electronically signed

## 2025-01-14 LAB
B PARAPERT DNA SPEC QL NAA+PROBE: NOT DETECTED
B PERT DNA SPEC QL NAA+PROBE: NOT DETECTED

## 2025-01-16 ENCOUNTER — ANCILLARY PROCEDURE (OUTPATIENT)
Dept: GENERAL RADIOLOGY | Facility: CLINIC | Age: 55
End: 2025-01-16
Attending: PHYSICIAN ASSISTANT

## 2025-01-16 ENCOUNTER — OFFICE VISIT (OUTPATIENT)
Dept: URGENT CARE | Facility: URGENT CARE | Age: 55
End: 2025-01-16

## 2025-01-16 ENCOUNTER — NURSE TRIAGE (OUTPATIENT)
Dept: FAMILY MEDICINE | Facility: CLINIC | Age: 55
End: 2025-01-16

## 2025-01-16 VITALS
BODY MASS INDEX: 31.64 KG/M2 | DIASTOLIC BLOOD PRESSURE: 76 MMHG | SYSTOLIC BLOOD PRESSURE: 109 MMHG | OXYGEN SATURATION: 98 % | TEMPERATURE: 102.8 F | WEIGHT: 202 LBS | HEART RATE: 120 BPM

## 2025-01-16 DIAGNOSIS — J18.9 PNEUMONIA OF LEFT LOWER LOBE DUE TO INFECTIOUS ORGANISM: Primary | ICD-10-CM

## 2025-01-16 DIAGNOSIS — J40 BRONCHITIS WITH ACUTE WHEEZING: ICD-10-CM

## 2025-01-16 DIAGNOSIS — R50.9 FEBRILE ILLNESS: ICD-10-CM

## 2025-01-16 DIAGNOSIS — R05.1 ACUTE COUGH: ICD-10-CM

## 2025-01-16 LAB
BASOPHILS # BLD AUTO: 0 10E3/UL (ref 0–0.2)
BASOPHILS NFR BLD AUTO: 0 %
DEPRECATED S PYO AG THROAT QL EIA: NEGATIVE
EOSINOPHIL # BLD AUTO: 0 10E3/UL (ref 0–0.7)
EOSINOPHIL NFR BLD AUTO: 0 %
ERYTHROCYTE [DISTWIDTH] IN BLOOD BY AUTOMATED COUNT: 13 % (ref 10–15)
HCT VFR BLD AUTO: 40.4 % (ref 35–47)
HGB BLD-MCNC: 13.2 G/DL (ref 11.7–15.7)
IMM GRANULOCYTES # BLD: 0 10E3/UL
IMM GRANULOCYTES NFR BLD: 0 %
LYMPHOCYTES # BLD AUTO: 0.9 10E3/UL (ref 0.8–5.3)
LYMPHOCYTES NFR BLD AUTO: 20 %
MCH RBC QN AUTO: 27.8 PG (ref 26.5–33)
MCHC RBC AUTO-ENTMCNC: 32.7 G/DL (ref 31.5–36.5)
MCV RBC AUTO: 85 FL (ref 78–100)
MONOCYTES # BLD AUTO: 0.2 10E3/UL (ref 0–1.3)
MONOCYTES NFR BLD AUTO: 5 %
NEUTROPHILS # BLD AUTO: 3.2 10E3/UL (ref 1.6–8.3)
NEUTROPHILS NFR BLD AUTO: 74 %
PLATELET # BLD AUTO: 275 10E3/UL (ref 150–450)
RBC # BLD AUTO: 4.74 10E6/UL (ref 3.8–5.2)
S PYO DNA THROAT QL NAA+PROBE: NOT DETECTED
WBC # BLD AUTO: 4.3 10E3/UL (ref 4–11)

## 2025-01-16 RX ORDER — PREDNISONE 20 MG/1
40 TABLET ORAL DAILY
Qty: 10 TABLET | Refills: 0 | Status: SHIPPED | OUTPATIENT
Start: 2025-01-16 | End: 2025-01-21

## 2025-01-16 RX ORDER — IPRATROPIUM BROMIDE AND ALBUTEROL SULFATE 2.5; .5 MG/3ML; MG/3ML
3 SOLUTION RESPIRATORY (INHALATION) ONCE
Status: COMPLETED | OUTPATIENT
Start: 2025-01-16 | End: 2025-01-16

## 2025-01-16 RX ORDER — ALBUTEROL SULFATE 90 UG/1
2 INHALANT RESPIRATORY (INHALATION) EVERY 6 HOURS PRN
Qty: 18 G | Refills: 0 | Status: SHIPPED | OUTPATIENT
Start: 2025-01-16

## 2025-01-16 RX ORDER — AMOXICILLIN 500 MG/1
1000 CAPSULE ORAL 3 TIMES DAILY
Qty: 42 CAPSULE | Refills: 0 | Status: SHIPPED | OUTPATIENT
Start: 2025-01-16 | End: 2025-01-23

## 2025-01-16 RX ORDER — CODEINE PHOSPHATE AND GUAIFENESIN 10; 100 MG/5ML; MG/5ML
1-2 SOLUTION ORAL EVERY 4 HOURS PRN
Qty: 80 ML | Refills: 0 | Status: SHIPPED | OUTPATIENT
Start: 2025-01-16

## 2025-01-16 RX ORDER — AZITHROMYCIN 250 MG/1
TABLET, FILM COATED ORAL
Qty: 6 TABLET | Refills: 0 | Status: SHIPPED | OUTPATIENT
Start: 2025-01-16 | End: 2025-01-21

## 2025-01-16 RX ADMIN — IPRATROPIUM BROMIDE AND ALBUTEROL SULFATE 3 ML: 2.5; .5 SOLUTION RESPIRATORY (INHALATION) at 13:28

## 2025-01-16 ASSESSMENT — ENCOUNTER SYMPTOMS
DIARRHEA: 1
SHORTNESS OF BREATH: 1
RHINORRHEA: 1
WHEEZING: 1
FEVER: 1
NAUSEA: 1
BACK PAIN: 1
COUGH: 1
CHEST TIGHTNESS: 1
SORE THROAT: 1

## 2025-01-16 NOTE — PATIENT INSTRUCTIONS
Stay at home.   Get lots of rest and fluids.     Use albuterol inhaler throughout the day and start prednisone 5 day course. Take this in the mornings.

## 2025-01-16 NOTE — TELEPHONE ENCOUNTER
S-(situation): Worsening cough    B-(background): Seen in UC 1/13/25 - all testing negative. Gave tessalon pearls and cough medicine.    A-(assessment): Cough is worsening, feels burning in chest, lost #10 since Monday, nasal congestion/drainage. Denies any other symptoms.    R-(recommendations): Recommended follow up with UC as pt has worsening symptoms and states lost #10 since Monday. Expressed understanding and acceptance of the plan. Had no further questions at this time.  Advised can call back to clinic at any time with concerns.     Bibiana VELAZQUEZ, RN, PHN    Reason for Disposition   Patient sounds very sick or weak to the triager    Additional Information   Negative: Bluish (or gray) lips or face   Negative: SEVERE difficulty breathing (e.g., struggling for each breath, speaks in single words)   Negative: Rapid onset of cough and has hives   Negative: Coughing started suddenly after medicine, an allergic food or bee sting   Negative: Difficulty breathing after exposure to flames, smoke, or fumes   Negative: Sounds like a life-threatening emergency to the triager   Negative: Previous asthma attacks and this feels like asthma attack   Negative: Dry cough (non-productive; no sputum or minimal clear sputum) and within 14 days of COVID-19 Exposure   Negative: MODERATE difficulty breathing (e.g., speaks in phrases, SOB even at rest, pulse 100-120) and still present when not coughing   Negative: Chest pain present when not coughing   Negative: Passed out (e.g., fainted, lost consciousness, blacked out and was not responding)    Protocols used: Cough-A-OH

## 2025-01-16 NOTE — PROGRESS NOTES
Assessment & Plan:        ICD-10-CM    1. Pneumonia of left lower lobe due to infectious organism  J18.9 azithromycin (ZITHROMAX) 250 MG tablet     amoxicillin (AMOXIL) 500 MG capsule      2. Febrile illness  R50.9 XR Chest 2 Views     Streptococcus A Rapid Screen w/Reflex to PCR - Clinic Collect     CBC with platelets and differential     Group A Streptococcus PCR Throat Swab     CBC with platelets and differential      3. Bronchitis with acute wheezing  J40 XR Chest 2 Views     ipratropium - albuterol 0.5 mg/2.5 mg/3 mL (DUONEB) neb solution 3 mL     albuterol (PROAIR HFA/PROVENTIL HFA/VENTOLIN HFA) 108 (90 Base) MCG/ACT inhaler     predniSONE (DELTASONE) 20 MG tablet      4. Acute cough  R05.1 guaiFENesin-codeine (ROBITUSSIN AC) 100-10 MG/5ML solution            Plan/Clinical Decision Making:    Patient with Flu like illness for 5 days with persistent fever, worsening cough.   Mild rhonchi on exam, Erythematous throat.   I independently visualized the xray:  no infiltrate or opacity seen.   CBC wnl. Strep negative.   Improved with Duo neb. Albuterol and prednisone course prescribed, reviewed side effects.   Patient requested refill of Robitussin AC. Reviewed  with no concerns.   Added antibiotics Zithromax and Amoxicillin due to radiology findings of Left lower lobe pneumonia suspected.   Patient Instructions   Stay at home.   Get lots of rest and fluids.     Use albuterol inhaler throughout the day and start prednisone 5 day course. Take this in the mornings.           Return if symptoms worsen or fail to improve, for in 3-5 days.     At the end of the encounter, I discussed results, diagnosis, medications. Discussed red flags for immediate return to clinic/ER, as well as indications for follow up if no improvement. Patient understood and agreed to plan. Patient was stable for discharge.        Nandini Bhatt PA-C on 1/16/2025 at 12:46 PM          Subjective:     HPI:    Jenifer is a 54 year old female who  presents to clinic today for the following health issues:  Chief Complaint   Patient presents with    Urgent Care     Seen on Jan 13, having fever 102, not able to eat, chest and back pain with cough, feeling like something is stuck in throat and chest, cough productive and then experiencing diarrhea, nausea, body aches, stomach ache, tried tylenol, motrin, lidocaine patches, tessalon ( not helping),      HPI    Patient developed flu like symptoms on 1/11/25. Having 5 days of symptoms.   Flu was negative, covid negative, strep and CXR negative.   Was prescribed Tamiflu for symptoms. Didn't take since was confused on taking.   Today fever, normal o2sat. Having chest symptoms ongoing, persistent cough.     Review of Systems   Constitutional:  Positive for fever.   HENT:  Positive for congestion, rhinorrhea and sore throat.    Respiratory:  Positive for cough, chest tightness, shortness of breath and wheezing.    Cardiovascular:  Positive for chest pain (from coughing).   Gastrointestinal:  Positive for diarrhea and nausea.   Musculoskeletal:  Positive for back pain (with coughing).         Patient Active Problem List   Diagnosis    CARDIOVASCULAR SCREENING; LDL GOAL LESS THAN 160    Mild major depression (H)    Chemical dependency (H)    CTS (carpal tunnel syndrome)    Mechanical complication due to intrauterine contraceptive device    Night sweats    Female pelvic pain    Mirena IUD placed 1/10/14    Implanon removal    Facial cellulitis    Acute otitis externa of left ear, unspecified type    Acute otitis media, unspecified otitis media type        Past Medical History:   Diagnosis Date    Abnormal Pap smear of cervix 1991/1992    normal colposcopy. normal paps since    Breast cancer     Depressive disorder     Hypertension     Migraine        Social History     Tobacco Use    Smoking status: Never    Smokeless tobacco: Never   Substance Use Topics    Alcohol use: No     Comment: rare             Objective:      Vitals:    01/16/25 1223   BP: 109/76   Pulse: 120   Temp: (!) 102.8  F (39.3  C)   TempSrc: Tympanic   SpO2: 98%   Weight: 91.6 kg (202 lb)         Physical Exam   EXAM:   Pleasant, alert, appropriate appearance. Appears ill, constant coughing during visit.   Head Exam: Normocephalic, atraumatic.  Eye Exam:   non icteric/injection.    Ear Exam: TMs grey without bulging. Normal canals.  Normal pinna.  Nose Exam: Normal external nose.    OroPharynx Exam:  Moist mucous membranes. positive erythema, pharynx without exudate or hypertrophy.  Neck/Thyroid Exam:  No LAD.   Chest/Respiratory Exam:Mild rhonchi, clears with coughing.   Cardiovascular Exam: RRR. No murmur or rubs.      Results:  Results for orders placed or performed in visit on 01/16/25   XR Chest 2 Views     Status: None    Narrative    EXAM: XR CHEST 2 VIEWS  LOCATION: [facility]  DATE: 1/16/2025    INDICATION:  Febrile illness, Bronchitis with acute wheezing  COMPARISON: 1/13/2025      Impression    IMPRESSION: Patchy airspace and interstitial opacities are seen in the left lower lung concerning for pneumonia. Normal cardiomediastinal silhouette. Surgical clips are seen overlying the right axilla and mediastinum. Mild degenerative changes are seen   in the spine.   Results for orders placed or performed in visit on 01/16/25   CBC with platelets and differential     Status: None   Result Value Ref Range    WBC Count 4.3 4.0 - 11.0 10e3/uL    RBC Count 4.74 3.80 - 5.20 10e6/uL    Hemoglobin 13.2 11.7 - 15.7 g/dL    Hematocrit 40.4 35.0 - 47.0 %    MCV 85 78 - 100 fL    MCH 27.8 26.5 - 33.0 pg    MCHC 32.7 31.5 - 36.5 g/dL    RDW 13.0 10.0 - 15.0 %    Platelet Count 275 150 - 450 10e3/uL    % Neutrophils 74 %    % Lymphocytes 20 %    % Monocytes 5 %    % Eosinophils 0 %    % Basophils 0 %    % Immature Granulocytes 0 %    Absolute Neutrophils 3.2 1.6 - 8.3 10e3/uL    Absolute Lymphocytes 0.9 0.8 - 5.3 10e3/uL    Absolute Monocytes  0.2 0.0 - 1.3 10e3/uL    Absolute Eosinophils 0.0 0.0 - 0.7 10e3/uL    Absolute Basophils 0.0 0.0 - 0.2 10e3/uL    Absolute Immature Granulocytes 0.0 <=0.4 10e3/uL   Streptococcus A Rapid Screen w/Reflex to PCR - Clinic Collect     Status: Normal    Specimen: Throat; Swab   Result Value Ref Range    Group A Strep antigen Negative Negative   CBC with platelets and differential     Status: None    Narrative    The following orders were created for panel order CBC with platelets and differential.  Procedure                               Abnormality         Status                     ---------                               -----------         ------                     CBC with platelets and d...[[id number]]                      Final result                 Please view results for these tests on the individual orders.

## 2025-01-18 ENCOUNTER — APPOINTMENT (OUTPATIENT)
Dept: CT IMAGING | Facility: CLINIC | Age: 55
End: 2025-01-18
Attending: EMERGENCY MEDICINE
Payer: COMMERCIAL

## 2025-01-18 ENCOUNTER — HOSPITAL ENCOUNTER (EMERGENCY)
Facility: CLINIC | Age: 55
Discharge: HOME | End: 2025-01-19
Attending: EMERGENCY MEDICINE | Admitting: EMERGENCY MEDICINE
Payer: COMMERCIAL

## 2025-01-18 VITALS
DIASTOLIC BLOOD PRESSURE: 68 MMHG | HEART RATE: 82 BPM | SYSTOLIC BLOOD PRESSURE: 102 MMHG | HEIGHT: 67 IN | OXYGEN SATURATION: 97 % | BODY MASS INDEX: 31.66 KG/M2 | WEIGHT: 201.72 LBS | TEMPERATURE: 99.3 F | RESPIRATION RATE: 18 BRPM

## 2025-01-18 DIAGNOSIS — J10.1 INFLUENZA A: ICD-10-CM

## 2025-01-18 LAB
ALBUMIN SERPL BCG-MCNC: 4.2 G/DL (ref 3.5–5.2)
ALP SERPL-CCNC: 81 U/L (ref 40–150)
ALT SERPL W P-5'-P-CCNC: 62 U/L (ref 0–50)
ANION GAP SERPL CALCULATED.3IONS-SCNC: 15 MMOL/L (ref 7–15)
AST SERPL W P-5'-P-CCNC: 96 U/L (ref 0–45)
BASOPHILS # BLD AUTO: 0 10E3/UL (ref 0–0.2)
BASOPHILS NFR BLD AUTO: 0 %
BILIRUB SERPL-MCNC: 0.3 MG/DL
BUN SERPL-MCNC: 20 MG/DL (ref 6–20)
CALCIUM SERPL-MCNC: 9 MG/DL (ref 8.8–10.4)
CHLORIDE SERPL-SCNC: 101 MMOL/L (ref 98–107)
CREAT SERPL-MCNC: 1.11 MG/DL (ref 0.51–0.95)
EGFRCR SERPLBLD CKD-EPI 2021: 59 ML/MIN/1.73M2
EOSINOPHIL # BLD AUTO: 0 10E3/UL (ref 0–0.7)
EOSINOPHIL NFR BLD AUTO: 0 %
ERYTHROCYTE [DISTWIDTH] IN BLOOD BY AUTOMATED COUNT: 13.2 % (ref 10–15)
FLUAV RNA SPEC QL NAA+PROBE: POSITIVE
FLUBV RNA RESP QL NAA+PROBE: NEGATIVE
GLUCOSE SERPL-MCNC: 109 MG/DL (ref 70–99)
HCO3 SERPL-SCNC: 26 MMOL/L (ref 22–29)
HCT VFR BLD AUTO: 40.2 % (ref 35–47)
HGB BLD-MCNC: 13.2 G/DL (ref 11.7–15.7)
HOLD SPECIMEN: NORMAL
HOLD SPECIMEN: NORMAL
IMM GRANULOCYTES # BLD: 0 10E3/UL
IMM GRANULOCYTES NFR BLD: 1 %
LIPASE SERPL-CCNC: 60 U/L (ref 13–60)
LYMPHOCYTES # BLD AUTO: 1.5 10E3/UL (ref 0.8–5.3)
LYMPHOCYTES NFR BLD AUTO: 44 %
MCH RBC QN AUTO: 27.7 PG (ref 26.5–33)
MCHC RBC AUTO-ENTMCNC: 32.8 G/DL (ref 31.5–36.5)
MCV RBC AUTO: 85 FL (ref 78–100)
MONOCYTES # BLD AUTO: 0.3 10E3/UL (ref 0–1.3)
MONOCYTES NFR BLD AUTO: 8 %
NEUTROPHILS # BLD AUTO: 1.6 10E3/UL (ref 1.6–8.3)
NEUTROPHILS NFR BLD AUTO: 47 %
NRBC # BLD AUTO: 0 10E3/UL
NRBC BLD AUTO-RTO: 0 /100
PLAT MORPH BLD: ABNORMAL
PLATELET # BLD AUTO: 254 10E3/UL (ref 150–450)
POTASSIUM SERPL-SCNC: 4 MMOL/L (ref 3.4–5.3)
PROT SERPL-MCNC: 7.4 G/DL (ref 6.4–8.3)
RBC # BLD AUTO: 4.76 10E6/UL (ref 3.8–5.2)
RBC MORPH BLD: ABNORMAL
RSV RNA SPEC NAA+PROBE: NEGATIVE
SARS-COV-2 RNA RESP QL NAA+PROBE: NEGATIVE
SODIUM SERPL-SCNC: 142 MMOL/L (ref 135–145)
VARIANT LYMPHS BLD QL SMEAR: PRESENT
WBC # BLD AUTO: 3.5 10E3/UL (ref 4–11)

## 2025-01-18 PROCEDURE — 83690 ASSAY OF LIPASE: CPT | Performed by: EMERGENCY MEDICINE

## 2025-01-18 PROCEDURE — 85004 AUTOMATED DIFF WBC COUNT: CPT | Performed by: EMERGENCY MEDICINE

## 2025-01-18 PROCEDURE — 250N000011 HC RX IP 250 OP 636: Performed by: EMERGENCY MEDICINE

## 2025-01-18 PROCEDURE — 250N000013 HC RX MED GY IP 250 OP 250 PS 637: Performed by: EMERGENCY MEDICINE

## 2025-01-18 PROCEDURE — 85041 AUTOMATED RBC COUNT: CPT | Performed by: EMERGENCY MEDICINE

## 2025-01-18 PROCEDURE — 96360 HYDRATION IV INFUSION INIT: CPT | Mod: 59

## 2025-01-18 PROCEDURE — 250N000009 HC RX 250: Performed by: EMERGENCY MEDICINE

## 2025-01-18 PROCEDURE — 36415 COLL VENOUS BLD VENIPUNCTURE: CPT | Performed by: EMERGENCY MEDICINE

## 2025-01-18 PROCEDURE — 71275 CT ANGIOGRAPHY CHEST: CPT

## 2025-01-18 PROCEDURE — 82040 ASSAY OF SERUM ALBUMIN: CPT | Performed by: EMERGENCY MEDICINE

## 2025-01-18 PROCEDURE — 87637 SARSCOV2&INF A&B&RSV AMP PRB: CPT | Performed by: EMERGENCY MEDICINE

## 2025-01-18 PROCEDURE — 258N000003 HC RX IP 258 OP 636: Performed by: EMERGENCY MEDICINE

## 2025-01-18 PROCEDURE — 99285 EMERGENCY DEPT VISIT HI MDM: CPT | Mod: 25

## 2025-01-18 RX ORDER — HYDROCODONE BITARTRATE AND ACETAMINOPHEN 5; 325 MG/1; MG/1
2 TABLET ORAL ONCE
Status: COMPLETED | OUTPATIENT
Start: 2025-01-18 | End: 2025-01-18

## 2025-01-18 RX ORDER — IOPAMIDOL 755 MG/ML
500 INJECTION, SOLUTION INTRAVASCULAR ONCE
Status: COMPLETED | OUTPATIENT
Start: 2025-01-18 | End: 2025-01-18

## 2025-01-18 RX ADMIN — IOPAMIDOL 67 ML: 755 INJECTION, SOLUTION INTRAVENOUS at 23:32

## 2025-01-18 RX ADMIN — SODIUM CHLORIDE 86 ML: 9 INJECTION, SOLUTION INTRAVENOUS at 23:32

## 2025-01-18 RX ADMIN — HYDROCODONE BITARTRATE AND ACETAMINOPHEN 2 TABLET: 5; 325 TABLET ORAL at 22:30

## 2025-01-18 RX ADMIN — SODIUM CHLORIDE 1000 ML: 9 INJECTION, SOLUTION INTRAVENOUS at 21:23

## 2025-01-18 ASSESSMENT — ACTIVITIES OF DAILY LIVING (ADL)
ADLS_ACUITY_SCORE: 42
ADLS_ACUITY_SCORE: 42

## 2025-01-19 ASSESSMENT — ACTIVITIES OF DAILY LIVING (ADL): ADLS_ACUITY_SCORE: 42

## 2025-01-19 NOTE — ED PROVIDER NOTES
"  Emergency Department Note      History of Present Illness     Chief Complaint   Cough    HPI   Jenifer Delong is a 54 year old female to the ED for the evaluation of cough. The patient states that she has been experiencing worsening cough, chest pain, and shortness of breath that became especially bad tonight. She notes that she's been seen at a clinic multiple times where she was diagnosed with influenza on Monday (25) and pneumonia on Thursday (25). Jenifer was prescribed antibiotics on Thursday which she has been taking as prescribed other than missed doses last night and this morning due to fatigue. She denies history of asthma or smoking.    Independent Historian   None    Review of External Notes   I reviewed an urgent care note from 25 regarding influenza diagnosis and Tamiflu prescription.  I reviewed an urgent care note from 25 where the patient was prescribed azithromycin and amoxicillin.  I reviewed the chest x-ray results from 25 and 25.    Past Medical History     Medical History and Problem List   Acute otitis externa and media  Aorta disorder  Breast cancer  Carpal tunnel syndrome  Chemical dependency  Deep incisional surgical site infection  Depression  Facial cellulitis  Hypertension  Mechanical complication with IUD  Migraine  Staph aureus  Varicella    Medications   Albuterol  Amoxicillin  Azithromycin  Benzonatate  Bupropion  Buspirone  Citalopram  Cyclobenzaprine  Epinephrine  Levonorgestrel  Lisinopril  Oseltamivir  Prednisone  Venlafaxine    Surgical History    section  Laparoscopy diagnostic  Mastectomy  Hysteroscopy  Dahinda teeth extraction  Carpal tunnel release  Breast biopsy  Lymph node dissection  Breast reduction    Physical Exam     Patient Vitals for the past 24 hrs:   BP Temp Temp src Pulse Resp SpO2 Height Weight   25 102/68 -- -- 82 18 97 % -- --   25 92/75 99.3  F (37.4  C) Temporal 98 20 98 % 1.702 m (5' 7\") 91.5 kg (201 " lb 11.5 oz)     Physical Exam  General: Patient is well appearing. No distress.  Head: Atraumatic.  Eyes: Conjunctivae and EOM are normal. No scleral icterus.  Neck: Normal range of motion. Neck supple.   Cardiovascular: Normal rate, regular rhythm, normal heart sounds and intact distal pulses.   Pulmonary/Chest: Breath sounds normal. No respiratory distress.  Abdominal: Soft. Bowel sounds are normal. No distension. No tenderness. No rebound or guarding.   Musculoskeletal: Normal range of motion.  Skin: Warm and dry. No rash noted. Not diaphoretic.      Diagnostics     Lab Results   Labs Ordered and Resulted from Time of ED Arrival to Time of ED Departure   COMPREHENSIVE METABOLIC PANEL - Abnormal       Result Value    Sodium 142      Potassium 4.0      Carbon Dioxide (CO2) 26      Anion Gap 15      Urea Nitrogen 20.0      Creatinine 1.11 (*)     GFR Estimate 59 (*)     Calcium 9.0      Chloride 101      Glucose 109 (*)     Alkaline Phosphatase 81      AST 96 (*)     ALT 62 (*)     Protein Total 7.4      Albumin 4.2      Bilirubin Total 0.3     CBC WITH PLATELETS AND DIFFERENTIAL - Abnormal    WBC Count 3.5 (*)     RBC Count 4.76      Hemoglobin 13.2      Hematocrit 40.2      MCV 85      MCH 27.7      MCHC 32.8      RDW 13.2      Platelet Count 254      % Neutrophils 47      % Lymphocytes 44      % Monocytes 8      % Eosinophils 0      % Basophils 0      % Immature Granulocytes 1      NRBCs per 100 WBC 0      Absolute Neutrophils 1.6      Absolute Lymphocytes 1.5      Absolute Monocytes 0.3      Absolute Eosinophils 0.0      Absolute Basophils 0.0      Absolute Immature Granulocytes 0.0      Absolute NRBCs 0.0     RBC AND PLATELET MORPHOLOGY - Abnormal    RBC Morphology Confirmed RBC Indices      Platelet Assessment        Value: Automated Count Confirmed. Platelet morphology is normal.    Reactive Lymphocytes Present (*)    INFLUENZA A/B, RSV AND SARS-COV2 PCR - Abnormal    Influenza A PCR Positive (*)      Influenza B PCR Negative      RSV PCR Negative      SARS CoV2 PCR Negative     LIPASE - Normal    Lipase 60         Imaging   CT Chest Pulmonary Embolism w Contrast   Final Result   IMPRESSION:   1.  No evidence of pulmonary embolus to the segmental level.   2.  Multifocal peripheral groundglass opacities and less so consolidative opacities seen throughout the lungs bilaterally most pronounced in the bilateral lower lobes, concerning for a multifocal infectious and/or inflammatory process including atypical    infectious processes such as viral pneumonia including Covid 19.. Correlation with patient's clinical symptomatology and sputum analysis is recommended.        Independent Interpretation   CT PE no large PE.  Appears infectious multifocal anterior non organized infiltrate.    ED Course      Medications Administered   Medications   sodium chloride 0.9% BOLUS 1,000 mL (0 mLs Intravenous Stopped 1/18/25 2223)   HYDROcodone-acetaminophen (NORCO) 5-325 MG per tablet 2 tablet (2 tablets Oral $Given 1/18/25 2230)   CT scan flush (86 mLs Intravenous $Given 1/18/25 2332)   iopamidol (ISOVUE-370) solution 500 mL (67 mLs Intravenous $Given 1/18/25 2332)       Procedures   Procedures     Discussion of Management       ED Course   ED Course as of 01/19/25 0100   Sat Jan 18, 2025 2222 I obtained the history and examined the patient as noted above.          Additional Documentation      Medical Decision Making / Diagnosis       MDM   Jenifer Delong is a 54 year old female Jenifer Delong is a 54 year old female who presents for evaluation of cough, fever and myalgias.     This is consistent with influenza.   They are at risk for pneumonia but no signs of this are detected on today's visit. Appear viral.  However has already been prescribed abx and tamiflu and currently taking.   Close followup of primary care physician is indicated and return to the ED for high fevers > 103 for more than 48 hours more, increasing productive  cough, shortness of breath, or confusion.  There is no signs of serious bacterial infection such as bacteremia, meningitis, UTI/pyelonephritis, strep pharyngitis, etc.       Disposition   The patient was discharged.     Diagnosis     ICD-10-CM    1. Influenza A  J10.1            Discharge Medications   New Prescriptions    No medications on file         Scribe Disclosure:  Klely AMIN, am serving as a scribe at 10:29 PM on 1/18/2025 to document services personally performed by Rogelio Whitt MD based on my observations and the provider's statements to me.        Rogelio Whitt MD  01/19/25 0101

## 2025-01-22 ENCOUNTER — OFFICE VISIT (OUTPATIENT)
Dept: URGENT CARE | Facility: URGENT CARE | Age: 55
End: 2025-01-22
Payer: COMMERCIAL

## 2025-01-22 ENCOUNTER — NURSE TRIAGE (OUTPATIENT)
Dept: FAMILY MEDICINE | Facility: CLINIC | Age: 55
End: 2025-01-22
Payer: COMMERCIAL

## 2025-01-22 VITALS
DIASTOLIC BLOOD PRESSURE: 85 MMHG | OXYGEN SATURATION: 100 % | SYSTOLIC BLOOD PRESSURE: 129 MMHG | HEART RATE: 69 BPM | BODY MASS INDEX: 31.32 KG/M2 | WEIGHT: 200 LBS | TEMPERATURE: 98.3 F

## 2025-01-22 DIAGNOSIS — R05.2 SUBACUTE COUGH: Primary | ICD-10-CM

## 2025-01-22 DIAGNOSIS — M79.18 MYALGIA, MULTIPLE SITES: ICD-10-CM

## 2025-01-22 PROCEDURE — 99213 OFFICE O/P EST LOW 20 MIN: CPT | Performed by: PHYSICIAN ASSISTANT

## 2025-01-22 RX ORDER — FLUTICASONE PROPIONATE 110 UG/1
1 AEROSOL, METERED RESPIRATORY (INHALATION) 2 TIMES DAILY
Qty: 12 G | Refills: 0 | Status: SHIPPED | OUTPATIENT
Start: 2025-01-22

## 2025-01-22 RX ORDER — CODEINE PHOSPHATE AND GUAIFENESIN 10; 100 MG/5ML; MG/5ML
1-2 SOLUTION ORAL EVERY 4 HOURS PRN
Qty: 120 ML | Refills: 0 | Status: SHIPPED | OUTPATIENT
Start: 2025-01-22

## 2025-01-22 NOTE — TELEPHONE ENCOUNTER
"    Situation:   Patient calls and asks if she can come in and get a different medication to help her body aches.     Patient explains she has Flu A and pneumonia in both of her lungs bases.     Background:  Ed 1/18 Flu A+  CT- + pneumonia   Prednisone and has completed/taking anx as directed     Assessment:    Symptoms have been present for 1.5 weeks and she is sick of it.      Denied SOB worsening, she states it is the same   Sounds SOB talking on the phone.     She states her SOB with talking, walking or going up stairs is moderate - same as when in er     Sometimes coughs up phlegm-whitish yellow     Cough sounds harsh and dry     \"My Back and lower lungs feel like someone is crushing my back and sides.\"   Lower chest pain when she takes a deep breath- she states this is the same as when she was in er     She is using her Albuterol inhaler for once an hour    Severe body aches pain - \"hurts so bad to move\" despite tylenol and motrin. Patient has tried warm baths and massage.   She has been taking 800 mg of motrin every 6 hours and 1200 mg of Tylenol every 4 hours.     Does not have an 02 monitor    Denied fever     Recommendation:  Explained protocol disposition- ed/uc or to office with approval.   Patient states she is going to start with   in [place].      Take Tylenol and motrin as directed -   4000mg max of Tylenol in 24 hours  Huddled with Dr. Martinez - urgent for assessment     Routed- no pcp in [facility] for 2nd level triage  Advised of protocol disposition.     Patient denied any other questions or concerns and agrees with plan.       Reason for Disposition   MODERATE difficulty breathing (e.g., speaks in phrases, SOB even at rest, pulse 100-120)    Additional Information   Negative: SEVERE difficulty breathing (e.g., struggling for each breath, speaks in single words)   Negative: Bluish (or gray) lips or face now   Negative: Difficult to awaken or acting confused (e.g., disoriented, slurred speech)   " Negative: Stridor (harsh sound while breathing in)   Negative: Slow, shallow and weak breathing   Negative: Sounds like a life-threatening emergency to the triager   Negative: Main symptom is coughing up blood (Exception: Blood-tinged sputum.)   Negative: New-onset or worsening chest pain   Negative: Oxygen level (e.g., pulse oximetry) 90% or lower    Protocols used: Pneumonia Follow-up Call-A-OH

## 2025-01-22 NOTE — PROGRESS NOTES
"Assessment & Plan     Subacute cough  Patient with a cough for the past 2 weeks.  Diagnosed with pneumonia a week ago, influenza A 4 days ago.  Completed Zithromax yesterday.  Will complete amoxicillin tomorrow.  Also completed Tamiflu recently.  On exam she is in no acute respiratory distress.  Vitals are stable.  She is nontoxic-appearing.  Discussed with patient cough can take an additional 3 weeks to resolve given both pneumonia and influenza infections.  Flovent inhaler prescribed today to use daily to help with bronchospasms.  Guaifenesin with codeine as needed for cough.  Recommended to continue pushing fluids.  Will anticipate gradual improvement in symptoms.  Follow-up if any worsening symptoms.  Patient agrees.  - guaiFENesin-codeine (ROBITUSSIN AC) 100-10 MG/5ML solution  Dispense: 120 mL; Refill: 0  - fluticasone (FLOVENT HFA) 110 MCG/ACT inhaler  Dispense: 12 g; Refill: 0    Myalgia, multiple sites  In the setting of influenza A illness.  Tylenol/Motrin as needed.  Warm baths.  Will anticipate gradual improvement soon.  Patient agrees with the plan.       Return in about 1 week (around 1/29/2025) for Symptoms failing to improve.    Giovana Joy PA-C  [facility] [place]    Dallas Burgess is a 54 year old female who presents to clinic today for the following health issues:  Chief Complaint   Patient presents with    Urgent Care     Recent influenza A and pneumonia dx, still having sob, worse up and down stairs, taking motrin and tylenol and still no relief, lower back and side feel like being \"crushed\", chest pressure, cough slightly better, no fever recently, still having decreased appetite,        HPI  Patient is presenting to urgent care today for complaint of a persistent cough.  Initial onset of symptoms 2 weeks ago.  She was diagnosed with pneumonia 6 days ago.  4 days ago was reevaluated in the ER diagnosed with influenza A.  CT chest scan 4 days ago revealed " multifocal groundglass opacities most pronounced in bilateral lower lobes.  Patient completed a course of  Zithromax yesterday. Will compete amoxicillin tomorrow.  No recent fevers or chills.  Patient reports moderate myalgias. Patient reports sob with activities. She is using albuterol inhaler multiple times daily.      Review of Systems  Constitutional, HEENT, cardiovascular, pulmonary, GI, , musculoskeletal, neuro, skin, endocrine and psych systems are negative, except as otherwise noted.      Objective    /85   Pulse 69   Temp 98.3  F (36.8  C) (Tympanic)   Wt 90.7 kg (200 lb)   SpO2 100%   BMI 31.32 kg/m    Physical Exam   GENERAL: alert and no distress  RESP: lungs with coarse breath sounds, no wheezing  CV: regular rate and rhythm, normal S1 S2  MS: no gross musculoskeletal defects noted, no edema  SKIN: no suspicious lesions or rashes

## 2025-01-27 ENCOUNTER — TELEPHONE (OUTPATIENT)
Dept: FAMILY MEDICINE | Facility: CLINIC | Age: 55
End: 2025-01-27
Payer: COMMERCIAL

## 2025-01-27 NOTE — LETTER
January 28, 2025      Jenifer MCKEON Paulasundaylani  [address] [facility] [address]        To Whom It May Concern:    Jenifer Delong  was seen on ***.  Please excuse her  until *** due to {WORK EXCUSE:519220}.        Sincerely,        Physician No Ref-Primary    Electronically signed

## 2025-01-27 NOTE — LETTER
January 28, 2025      Jenifer Delong  [address] [facility] [place] [address]        To Whom It May Concern:    Jenifer Delong  was seen on 1/22/2025.  Please excuse her absence from work from 1/13 through 1/25 due to acute medical illnesses.    [facility] Urgent Care visit 1/13: Diagnosis: Influenza-like illness  [facility] urgent care visit 1/16: Diagnosis: Pneumonia  [facility] ED visit 1/18 Diagnosis: Influenza A  [facility] urgent care visit: 1/22: Diagnosis: Subacute cough, myalgias        Sincerely,    Giovana Joy PA-C    Electronically signed

## 2025-01-27 NOTE — TELEPHONE ENCOUNTER
Forms/Letter Request    Type of form/letter: OTHER: Work note-documentation of visits       Do we have the form/letter: No    Who is the form from? Patient called to request note/letter from UC team showing her recent UC visit, and if able the ED visit as well.   Pt states that her work is not requesting letter/note documenting her visits or she may loss her job due to being out.  Pt states has not yet followed up with PC as her previous pc appt was cancelled.     Where did/will the form come from? No form    When is form/letter needed by: as able     How would you like the form/letter returned: [employer]    Patient Notified form requests are processed in 5-7 business days:    Could we send this information to you in [employer] or would you prefer to receive a phone call?:   Patient would like to be contacted via [employer]

## 2025-01-29 ENCOUNTER — TELEPHONE (OUTPATIENT)
Dept: URGENT CARE | Facility: URGENT CARE | Age: 55
End: 2025-01-29
Payer: COMMERCIAL

## 2025-01-29 NOTE — TELEPHONE ENCOUNTER
Called patient and let her know that we have her paper copy note for work.  Toyin South MA on 1/29/2025 at 10:22 AM

## 2025-02-17 ENCOUNTER — VIRTUAL VISIT (OUTPATIENT)
Dept: FAMILY MEDICINE | Facility: CLINIC | Age: 55
End: 2025-02-17
Payer: COMMERCIAL

## 2025-02-17 DIAGNOSIS — Z87.01 HISTORY OF INFLUENZA PNEUMONIA: Primary | ICD-10-CM

## 2025-02-17 PROCEDURE — 98004 SYNCH AUDIO-VIDEO EST SF 10: CPT | Performed by: PHYSICIAN ASSISTANT

## 2025-02-17 ASSESSMENT — PATIENT HEALTH QUESTIONNAIRE - PHQ9
SUM OF ALL RESPONSES TO PHQ QUESTIONS 1-9: 0
10. IF YOU CHECKED OFF ANY PROBLEMS, HOW DIFFICULT HAVE THESE PROBLEMS MADE IT FOR YOU TO DO YOUR WORK, TAKE CARE OF THINGS AT HOME, OR GET ALONG WITH OTHER PEOPLE: NOT DIFFICULT AT ALL
SUM OF ALL RESPONSES TO PHQ QUESTIONS 1-9: 0

## 2025-02-17 NOTE — LETTER
February 17, 2025      Jenifer Delong  [address] [address] [facility] [address]        To Whom It May Concern:     Jenifer Delong  was seen on 1/22/2025.  Please excuse her absence from work from 1/26/2025-2/1/2025 due to ongoing illness and symptoms.   [facility] visit 1/13: Diagnosis: Influenza-like illness  [facility] urgent care visit 1/16: Diagnosis: Pneumonia  [facility] ED visit 1/18 Diagnosis: Influenza A  [facility] urgent care visit: 1/22: Diagnosis: Subacute cough, myalgias    Sincerely,        Jose Moore PA-C    Electronically signed

## 2025-02-17 NOTE — PROGRESS NOTES
Jenifer is a 54 year old who is being evaluated via a billable video visit.        Assessment & Plan     (Z87.01) History of influenza pneumonia  (primary encounter diagnosis)  Comment: History of influenza pneumonia.  Patient requesting work note extended through February 1.  Note provided.  Patient otherwise feels well.      Subjective   Jenifer is a 54 year old, presenting for the following health issues:  Letter for School/Work (Return to work note. )      2/17/2025    11:12 AM   Additional Questions   Roomed by NAT RICH   Accompanied by SELF     Video Start Time: 1118      HPI     Patient presents for virtual visit has had illnesses over the month of January including URI suspected pneumonia was seen in urgent care on 1/13 as well as 1/16.  Was started on antibiotics with ongoing fever was seen in the emergency department on 1/18 was found to have influenza A.  With ongoing URI symptoms, fatigue and decreased appetite patient had called into work multiple days.  Patient was requesting a work note excusing her until February 1.  At this time patient feels well would like to return back to work.         Review of Systems  Constitutional, HEENT, cardiovascular, pulmonary, gi and gu systems are negative, except as otherwise noted.      Objective           Vitals:  No vitals were obtained today due to virtual visit.    Physical Exam   GENERAL: alert and no distress  EYES: Eyes grossly normal to inspection.  No discharge or erythema, or obvious scleral/conjunctival abnormalities.  RESP: No audible wheeze, cough, or visible cyanosis.    SKIN: Visible skin clear. No significant rash, abnormal pigmentation or lesions.  NEURO: Cranial nerves grossly intact.  Mentation and speech appropriate for age.  PSYCH: Appropriate affect, tone, and pace of words        Video-Visit Details    Type of service:  Video Visit   Video End Time:1124  Originating Location (pt. Location): Car  Distant Location (provider location):   On-site  Platform used for Video Visit: Le[employer]on  Signed Electronically by: Jose Moore PA-C    Answers submitted by the patient for this visit:  Patient Health Questionnaire (Submitted on 2/17/2025)  If you checked off any problems, how difficult have these problems made it for you to do your work, take care of things at home, or get along with other people?: Not difficult at all  PHQ9 TOTAL SCORE: 0

## 2025-07-12 ENCOUNTER — HEALTH MAINTENANCE LETTER (OUTPATIENT)
Age: 55
End: 2025-07-12

## (undated) RX ORDER — BUPIVACAINE HYDROCHLORIDE 5 MG/ML
INJECTION, SOLUTION EPIDURAL; INTRACAUDAL
Status: DISPENSED
Start: 2024-01-24

## (undated) RX ORDER — IBUPROFEN 600 MG/1
TABLET, FILM COATED ORAL
Status: DISPENSED
Start: 2024-01-24

## (undated) RX ORDER — OXYCODONE HYDROCHLORIDE 5 MG/1
TABLET ORAL
Status: DISPENSED
Start: 2024-01-24